# Patient Record
Sex: FEMALE | Race: WHITE | Employment: OTHER | URBAN - METROPOLITAN AREA
[De-identification: names, ages, dates, MRNs, and addresses within clinical notes are randomized per-mention and may not be internally consistent; named-entity substitution may affect disease eponyms.]

---

## 2018-08-13 ENCOUNTER — HOSPITAL ENCOUNTER (OUTPATIENT)
Age: 83
Discharge: HOME | End: 2018-08-15
Payer: MEDICARE

## 2018-08-13 PROCEDURE — 80048 BASIC METABOLIC PNL TOTAL CA: CPT

## 2018-08-13 PROCEDURE — 85025 COMPLETE CBC W/AUTO DIFF WBC: CPT

## 2018-08-13 PROCEDURE — 85651 RBC SED RATE NONAUTOMATED: CPT

## 2018-08-13 PROCEDURE — 36415 COLL VENOUS BLD VENIPUNCTURE: CPT

## 2018-08-13 PROCEDURE — 86141 C-REACTIVE PROTEIN HS: CPT

## 2018-08-14 LAB
ANION GAP SERPL CALCULATED.3IONS-SCNC: 18 MMOL/L (ref 7–16)
BASOPHILS ABSOLUTE: 0.01 E9/L (ref 0–0.2)
BASOPHILS RELATIVE PERCENT: 0.2 % (ref 0–2)
BUN BLDV-MCNC: 17 MG/DL (ref 8–23)
C-REACTIVE PROTEIN, HIGH SENSITIVITY: 5.1 MG/L (ref 0–3)
CALCIUM SERPL-MCNC: 10.3 MG/DL (ref 8.6–10.2)
CHLORIDE BLD-SCNC: 98 MMOL/L (ref 98–107)
CO2: 24 MMOL/L (ref 22–29)
CREAT SERPL-MCNC: 0.8 MG/DL (ref 0.5–1)
EOSINOPHILS ABSOLUTE: 0.09 E9/L (ref 0.05–0.5)
EOSINOPHILS RELATIVE PERCENT: 1.7 % (ref 0–6)
GFR AFRICAN AMERICAN: >60
GFR NON-AFRICAN AMERICAN: >60 ML/MIN/1.73
GLUCOSE BLD-MCNC: 140 MG/DL (ref 74–109)
HCT VFR BLD CALC: 38.3 % (ref 34–48)
HEMOGLOBIN: 12.8 G/DL (ref 11.5–15.5)
IMMATURE GRANULOCYTES #: 0.01 E9/L
IMMATURE GRANULOCYTES %: 0.2 % (ref 0–5)
LYMPHOCYTES ABSOLUTE: 1.76 E9/L (ref 1.5–4)
LYMPHOCYTES RELATIVE PERCENT: 33 % (ref 20–42)
MCH RBC QN AUTO: 32.2 PG (ref 26–35)
MCHC RBC AUTO-ENTMCNC: 33.4 % (ref 32–34.5)
MCV RBC AUTO: 96.2 FL (ref 80–99.9)
MONOCYTES ABSOLUTE: 0.46 E9/L (ref 0.1–0.95)
MONOCYTES RELATIVE PERCENT: 8.6 % (ref 2–12)
NEUTROPHILS ABSOLUTE: 3.01 E9/L (ref 1.8–7.3)
NEUTROPHILS RELATIVE PERCENT: 56.3 % (ref 43–80)
PDW BLD-RTO: 14.1 FL (ref 11.5–15)
PLATELET # BLD: 190 E9/L (ref 130–450)
PMV BLD AUTO: 10.8 FL (ref 7–12)
POTASSIUM SERPL-SCNC: 3.8 MMOL/L (ref 3.5–5)
RBC # BLD: 3.98 E12/L (ref 3.5–5.5)
SEDIMENTATION RATE, ERYTHROCYTE: 30 MM/HR (ref 0–20)
SODIUM BLD-SCNC: 140 MMOL/L (ref 132–146)
WBC # BLD: 5.3 E9/L (ref 4.5–11.5)

## 2021-04-28 ENCOUNTER — HOSPITAL ENCOUNTER (INPATIENT)
Age: 86
LOS: 4 days | Discharge: HOSPICE | DRG: 083 | End: 2021-05-03
Attending: EMERGENCY MEDICINE | Admitting: SURGERY
Payer: MEDICARE

## 2021-04-28 DIAGNOSIS — S02.92XA CLOSED FRACTURE OF FACIAL BONE, UNSPECIFIED FACIAL BONE, INITIAL ENCOUNTER (HCC): ICD-10-CM

## 2021-04-28 DIAGNOSIS — S12.9XXA CLOSED FRACTURE OF CERVICAL VERTEBRA, UNSPECIFIED CERVICAL VERTEBRAL LEVEL, INITIAL ENCOUNTER (HCC): ICD-10-CM
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
## 2021-04-28 DIAGNOSIS — I60.9 SAH (SUBARACHNOID HEMORRHAGE) (HCC): ICD-10-CM

## 2021-04-28 PROCEDURE — 96374 THER/PROPH/DIAG INJ IV PUSH: CPT

## 2021-04-28 PROCEDURE — 99284 EMERGENCY DEPT VISIT MOD MDM: CPT

## 2021-04-28 PROCEDURE — 96375 TX/PRO/DX INJ NEW DRUG ADDON: CPT

## 2021-04-28 RX ORDER — ONDANSETRON 2 MG/ML
INJECTION INTRAMUSCULAR; INTRAVENOUS
Status: COMPLETED
Start: 2021-04-28 | End: 2021-04-29

## 2021-04-28 ASSESSMENT — PAIN DESCRIPTION - PAIN TYPE: TYPE: ACUTE PAIN

## 2021-04-28 NOTE — LETTER
Employer: RETIRED         Nondenominational: Sabianism   Primary Care Provider: Frank Rasmussen DO         Primary Phone: [phone]   EMERGENCY CONTACT   Contact Name Legal Guardian? Relationship to Patient Home Phone Work Phone   1. Sydnee Bowers  2. Cameron Ron      Brother/Sister  Brother/Sister [phone]              GUARANTOR            Guarantor: Eugenia Ochoa     : 1927   Address: [address] [place] [address] Sex: Female     [place] [address]     Relation to Patient: Self       Home Phone: [phone]   Guarantor ID: [id number]       Work Phone:     Guarantor Employer: RETIED         Status: RETIRED      COVERAGE        PRIMARY INSURANCE   Payor: [facility] MEDICARE Plan: CIARA GUIDO*   Payor Address: [facility] [id number] [place] [id number]       Group Number: [id number] Type: INDEMNITY   Subscriber Name: Ashkan Liang : 1927   Subscriber ID: [id number] Pat. Rel. to Sub: Self   SECONDARY INSURANCE   Payor:   Plan:     Payor Address:  ,           Group Number:   Insurance Type:     Subscriber Name:   Subscriber :     Subscriber ID:   Pat.  Rel. to Sub:

## 2021-04-28 NOTE — LETTER
[place] Department Medicaid  CERTIFICATION OF NECESSITY  FOR NON-EMERGENCY TRANSPORTATION   BY GROUND AMBULANCE      Individual Information   1. Name: Enrique Walter 2. [place] Medicaid Billing Number:    3. Address: [facility] Provider Information   4. Provider Name:    5. [place] Provider Number:  National Provider Identifier (NPI):      Certification  7. Criteria:  During transport, this individual requires:  [x] Medical treatment or continuous     supervision by an EMT. [x] The administration or regulation of oxygen by another person. [] Supervised protective restraint. 8. Period Beginning Date: 05/3/2021   9. Length  [x] Not more than 7 day(s)  [] One Year     Additional Information Relevant to Certification   10. Comments or Explanations, If Necessary or Appropriate   2L O2-UNABLE TO REGULATE OWN O2,HIGH FALL RISK, DEP X2,SDH,RETURNIING TO [facility] Practitioner Information   11. Name of Practitioner: Monica Dey MD   12. [place] Medicaid Provider Number, If Applicable:  [address] Provider Identifier (NPI):      Signature Information   14. Date of Signature: 2021 15. Name of Person Signing: Jonas Akers   16. Signature and Professional Designation: Electronically signed by YANNI Brooks on 5/3/2021 at 1:44 PM       [facility] [id number]  Rev. 2015       [address] [place] [address] Encounter Date/Time: 2021 [address] [facility] Account: [de-identified]    MRN: [id number]    Patient: Enrique Walter    Contact Serial #: [id number]      ENCOUNTER          Patient Class: I Private Enc? No Unit RM BD:  [facility] Service: Med/Surg   Encounter DX: SDH (subdural hematoma) *   ADM Provider: Noemí Demarco MD   Procedure:     ATT Provider: Noemí Demarco MD   REF Provider:        Admission DX: SDH (subdural hematoma) ([facility] [address] and codes: 432. 1      PATIENT                 Name: Enrique Walter : 1927 (89 yrs) Address: [place] [address] Sex: Female   City: [address] [place] [address]         Marital Status:    Employer: RETIRED         Restorationism: Worship   Primary Care Provider: DO Vikas Ruiz Phone: [phone]   EMERGENCY CONTACT   Contact Name Legal Guardian? Relationship to Patient Home Phone Work Phone   1. Sydnee Bowers  2. William Moore      Brother/Sister  Brother/Sister [phone]              GUARANTOR            Guarantor: Dave Sharpe     : 1927   Address: [address] [place] [address] [address] Sex: Female     [place] [address]     Relation to Patient: Self       Home Phone: [phone]   Guarantor ID: [id number]       Work Phone:     Guarantor Employer: RETIED         Status: RETIRED      COVERAGE        PRIMARY INSURANCE   Payor: BC[employer] MEDICARE Plan: ANTHEM MEDIBLUE [place]SSENTIA*   Payor Address: [facility] [id number] [place] [id number]       Group Number: [id number] Type: INDEMNITY   Subscriber Name: Kirill Roa : 1927   Subscriber ID: [id number] Pat. Rel. to Sub: Self   SECONDARY INSURANCE   Payor:   Plan:     Payor Address:  ,           Group Number:   Insurance Type:     Subscriber Name:   Subscriber :     Subscriber ID:   Pat.  Rel. to Sub:

## 2021-04-29 ENCOUNTER — APPOINTMENT (OUTPATIENT)
Dept: CT IMAGING | Age: 86
DRG: 083 | End: 2021-04-29
Payer: MEDICARE

## 2021-04-29 ENCOUNTER — APPOINTMENT (OUTPATIENT)
Dept: MRI IMAGING | Age: 86
DRG: 083 | End: 2021-04-29
Payer: MEDICARE

## 2021-04-29 PROBLEM — S06.5XAA SDH (SUBDURAL HEMATOMA): Status: ACTIVE | Noted: 2021-04-29

## 2021-04-29 LAB
ALBUMIN SERPL-MCNC: 3.7 G/DL (ref 3.5–5.2)
ALBUMIN SERPL-MCNC: 3.9 G/DL (ref 3.5–5.2)
ALP BLD-CCNC: 78 U/L (ref 35–104)
ALP BLD-CCNC: 88 U/L (ref 35–104)
ALT SERPL-CCNC: 31 U/L (ref 0–32)
ALT SERPL-CCNC: 34 U/L (ref 0–32)
ANGLE (CLOT STRENGTH): 71.2 DEGREE (ref 59–74)
ANION GAP SERPL CALCULATED.3IONS-SCNC: 11 MMOL/L (ref 7–16)
ANION GAP SERPL CALCULATED.3IONS-SCNC: 11 MMOL/L (ref 7–16)
AST SERPL-CCNC: 29 U/L (ref 0–31)
AST SERPL-CCNC: 37 U/L (ref 0–31)
BASOPHILS ABSOLUTE: 0.01 E9/L (ref 0–0.2)
BASOPHILS RELATIVE PERCENT: 0.1 % (ref 0–2)
BILIRUB SERPL-MCNC: 1.2 MG/DL (ref 0–1.2)
BILIRUB SERPL-MCNC: 1.3 MG/DL (ref 0–1.2)
BUN BLDV-MCNC: 24 MG/DL (ref 6–23)
BUN BLDV-MCNC: 26 MG/DL (ref 6–23)
CALCIUM SERPL-MCNC: 10 MG/DL (ref 8.6–10.2)
CALCIUM SERPL-MCNC: 9.6 MG/DL (ref 8.6–10.2)
CHLORIDE BLD-SCNC: 101 MMOL/L (ref 98–107)
CHLORIDE BLD-SCNC: 99 MMOL/L (ref 98–107)
CO2: 27 MMOL/L (ref 22–29)
CO2: 29 MMOL/L (ref 22–29)
CREAT SERPL-MCNC: 0.8 MG/DL (ref 0.5–1)
CREAT SERPL-MCNC: 0.8 MG/DL (ref 0.5–1)
EKG ATRIAL RATE: 104 BPM
EKG Q-T INTERVAL: 394 MS
EKG QRS DURATION: 88 MS
EKG QTC CALCULATION (BAZETT): 500 MS
EKG R AXIS: -35 DEGREES
EKG T AXIS: 16 DEGREES
EKG VENTRICULAR RATE: 97 BPM
EOSINOPHILS ABSOLUTE: 0.01 E9/L (ref 0.05–0.5)
EOSINOPHILS RELATIVE PERCENT: 0.1 % (ref 0–6)
EPL-TEG: 0 % (ref 0–15)
G-TEG: 10 K D/SC (ref 4.5–11)
GFR AFRICAN AMERICAN: >60
GFR AFRICAN AMERICAN: >60
GFR NON-AFRICAN AMERICAN: >60 ML/MIN/1.73
GFR NON-AFRICAN AMERICAN: >60 ML/MIN/1.73
GLUCOSE BLD-MCNC: 207 MG/DL (ref 74–99)
GLUCOSE BLD-MCNC: 219 MG/DL (ref 74–99)
HCT VFR BLD CALC: 40.7 % (ref 34–48)
HEMOGLOBIN: 13.2 G/DL (ref 11.5–15.5)
IMMATURE GRANULOCYTES #: 0.02 E9/L
IMMATURE GRANULOCYTES %: 0.2 % (ref 0–5)
K (CLOTTING TIME): 1.3 MIN (ref 1–3)
LACTIC ACID: 1.8 MMOL/L (ref 0.5–2.2)
LY30 (FIBRINOLYSIS): 0 % (ref 0–8)
LYMPHOCYTES ABSOLUTE: 0.7 E9/L (ref 1.5–4)
LYMPHOCYTES RELATIVE PERCENT: 8.1 % (ref 20–42)
MA (MAX AMPLITUDE): 66.8 MM (ref 50–70)
MCH RBC QN AUTO: 31.4 PG (ref 26–35)
MCHC RBC AUTO-ENTMCNC: 32.4 % (ref 32–34.5)
MCV RBC AUTO: 96.9 FL (ref 80–99.9)
MONOCYTES ABSOLUTE: 0.35 E9/L (ref 0.1–0.95)
MONOCYTES RELATIVE PERCENT: 4 % (ref 2–12)
NEUTROPHILS ABSOLUTE: 7.58 E9/L (ref 1.8–7.3)
NEUTROPHILS RELATIVE PERCENT: 87.5 % (ref 43–80)
PDW BLD-RTO: 14.3 FL (ref 11.5–15)
PLATELET # BLD: 149 E9/L (ref 130–450)
PMV BLD AUTO: 10.3 FL (ref 7–12)
POTASSIUM REFLEX MAGNESIUM: 3.9 MMOL/L (ref 3.5–5)
POTASSIUM REFLEX MAGNESIUM: 4 MMOL/L (ref 3.5–5)
R (REACTION TIME): 4.8 MIN (ref 5–10)
RBC # BLD: 4.2 E12/L (ref 3.5–5.5)
SODIUM BLD-SCNC: 139 MMOL/L (ref 132–146)
SODIUM BLD-SCNC: 139 MMOL/L (ref 132–146)
TOTAL PROTEIN: 6.5 G/DL (ref 6.4–8.3)
TOTAL PROTEIN: 6.8 G/DL (ref 6.4–8.3)
TROPONIN: <0.01 NG/ML (ref 0–0.03)
WBC # BLD: 8.7 E9/L (ref 4.5–11.5)

## 2021-04-29 PROCEDURE — 93005 ELECTROCARDIOGRAM TRACING: CPT | Performed by: STUDENT IN AN ORGANIZED HEALTH CARE EDUCATION/TRAINING PROGRAM

## 2021-04-29 PROCEDURE — 6360000002 HC RX W HCPCS: Performed by: STUDENT IN AN ORGANIZED HEALTH CARE EDUCATION/TRAINING PROGRAM

## 2021-04-29 PROCEDURE — 70498 CT ANGIOGRAPHY NECK: CPT

## 2021-04-29 PROCEDURE — 85347 COAGULATION TIME ACTIVATED: CPT

## 2021-04-29 PROCEDURE — 2580000003 HC RX 258: Performed by: STUDENT IN AN ORGANIZED HEALTH CARE EDUCATION/TRAINING PROGRAM

## 2021-04-29 PROCEDURE — 84484 ASSAY OF TROPONIN QUANT: CPT

## 2021-04-29 PROCEDURE — 80053 COMPREHEN METABOLIC PANEL: CPT

## 2021-04-29 PROCEDURE — 72141 MRI NECK SPINE W/O DYE: CPT

## 2021-04-29 PROCEDURE — 70450 CT HEAD/BRAIN W/O DYE: CPT

## 2021-04-29 PROCEDURE — 85384 FIBRINOGEN ACTIVITY: CPT

## 2021-04-29 PROCEDURE — 2700000000 HC OXYGEN THERAPY PER DAY

## 2021-04-29 PROCEDURE — 99222 1ST HOSP IP/OBS MODERATE 55: CPT | Performed by: SURGERY

## 2021-04-29 PROCEDURE — 74177 CT ABD & PELVIS W/CONTRAST: CPT

## 2021-04-29 PROCEDURE — 6360000002 HC RX W HCPCS

## 2021-04-29 PROCEDURE — 90471 IMMUNIZATION ADMIN: CPT | Performed by: STUDENT IN AN ORGANIZED HEALTH CARE EDUCATION/TRAINING PROGRAM

## 2021-04-29 PROCEDURE — 71260 CT THORAX DX C+: CPT

## 2021-04-29 PROCEDURE — 85576 BLOOD PLATELET AGGREGATION: CPT

## 2021-04-29 PROCEDURE — 93010 ELECTROCARDIOGRAM REPORT: CPT | Performed by: INTERNAL MEDICINE

## 2021-04-29 PROCEDURE — 99222 1ST HOSP IP/OBS MODERATE 55: CPT | Performed by: OTOLARYNGOLOGY

## 2021-04-29 PROCEDURE — 90715 TDAP VACCINE 7 YRS/> IM: CPT | Performed by: STUDENT IN AN ORGANIZED HEALTH CARE EDUCATION/TRAINING PROGRAM

## 2021-04-29 PROCEDURE — 83605 ASSAY OF LACTIC ACID: CPT

## 2021-04-29 PROCEDURE — 6360000004 HC RX CONTRAST MEDICATION: Performed by: RADIOLOGY

## 2021-04-29 PROCEDURE — 85025 COMPLETE CBC W/AUTO DIFF WBC: CPT

## 2021-04-29 PROCEDURE — 36415 COLL VENOUS BLD VENIPUNCTURE: CPT

## 2021-04-29 PROCEDURE — 2060000000 HC ICU INTERMEDIATE R&B

## 2021-04-29 PROCEDURE — 99223 1ST HOSP IP/OBS HIGH 75: CPT | Performed by: SURGERY

## 2021-04-29 RX ORDER — TRAMADOL HYDROCHLORIDE 50 MG/1
50 TABLET ORAL EVERY 6 HOURS PRN
COMMUNITY

## 2021-04-29 RX ORDER — DIPHENHYDRAMINE HCL 25 MG
25 TABLET ORAL EVERY 6 HOURS PRN
COMMUNITY

## 2021-04-29 RX ORDER — SODIUM CHLORIDE 9 MG/ML
25 INJECTION, SOLUTION INTRAVENOUS PRN
Status: DISCONTINUED | OUTPATIENT
Start: 2021-04-29 | End: 2021-05-03 | Stop reason: HOSPADM

## 2021-04-29 RX ORDER — FERROUS SULFATE 325(65) MG
325 TABLET ORAL
COMMUNITY

## 2021-04-29 RX ORDER — ACETAMINOPHEN 325 MG/1
650 TABLET ORAL EVERY 4 HOURS PRN
COMMUNITY

## 2021-04-29 RX ORDER — FENTANYL CITRATE 50 UG/ML
25 INJECTION, SOLUTION INTRAMUSCULAR; INTRAVENOUS ONCE
Status: COMPLETED | OUTPATIENT
Start: 2021-04-29 | End: 2021-04-29

## 2021-04-29 RX ORDER — A/C/E/ZINC/SOD SELENATE/COPPER 5000-60-30
1 TABLET ORAL DAILY
COMMUNITY

## 2021-04-29 RX ORDER — SODIUM CHLORIDE 9 MG/ML
INJECTION, SOLUTION INTRAVENOUS CONTINUOUS
Status: DISCONTINUED | OUTPATIENT
Start: 2021-04-29 | End: 2021-04-30

## 2021-04-29 RX ORDER — ACETAMINOPHEN 325 MG/1
650 TABLET ORAL EVERY 4 HOURS PRN
Status: DISCONTINUED | OUTPATIENT
Start: 2021-04-29 | End: 2021-04-30

## 2021-04-29 RX ORDER — SENNA PLUS 8.6 MG/1
1 TABLET ORAL DAILY
COMMUNITY

## 2021-04-29 RX ORDER — TETANUS AND DIPHTHERIA TOXOIDS ADSORBED 2; 2 [LF]/.5ML; [LF]/.5ML
0.5 INJECTION INTRAMUSCULAR ONCE
Status: DISCONTINUED | OUTPATIENT
Start: 2021-04-29 | End: 2021-05-03 | Stop reason: HOSPADM

## 2021-04-29 RX ORDER — LEVETIRACETAM 5 MG/ML
500 INJECTION INTRAVASCULAR EVERY 12 HOURS
Status: DISCONTINUED | OUTPATIENT
Start: 2021-04-29 | End: 2021-05-03 | Stop reason: HOSPADM

## 2021-04-29 RX ORDER — SODIUM CHLORIDE 0.9 % (FLUSH) 0.9 %
5-40 SYRINGE (ML) INJECTION EVERY 12 HOURS SCHEDULED
Status: DISCONTINUED | OUTPATIENT
Start: 2021-04-29 | End: 2021-05-03 | Stop reason: HOSPADM

## 2021-04-29 RX ORDER — POLYETHYLENE GLYCOL 3350 17 G/17G
17 POWDER, FOR SOLUTION ORAL DAILY PRN
Status: DISCONTINUED | OUTPATIENT
Start: 2021-04-29 | End: 2021-05-03 | Stop reason: HOSPADM

## 2021-04-29 RX ORDER — SODIUM CHLORIDE 9 MG/ML
INJECTION, SOLUTION INTRAVENOUS CONTINUOUS
Status: DISCONTINUED | OUTPATIENT
Start: 2021-04-29 | End: 2021-04-29 | Stop reason: SDUPTHER

## 2021-04-29 RX ORDER — ASPIRIN 81 MG/1
81 TABLET ORAL DAILY
COMMUNITY

## 2021-04-29 RX ORDER — SODIUM CHLORIDE 0.9 % (FLUSH) 0.9 %
5-40 SYRINGE (ML) INJECTION PRN
Status: DISCONTINUED | OUTPATIENT
Start: 2021-04-29 | End: 2021-05-03 | Stop reason: HOSPADM

## 2021-04-29 RX ORDER — METOPROLOL SUCCINATE 100 MG/1
100 TABLET, EXTENDED RELEASE ORAL DAILY
COMMUNITY

## 2021-04-29 RX ORDER — FENTANYL CITRATE 50 UG/ML
25 INJECTION, SOLUTION INTRAMUSCULAR; INTRAVENOUS EVERY 4 HOURS PRN
Status: DISCONTINUED | OUTPATIENT
Start: 2021-04-29 | End: 2021-04-30

## 2021-04-29 RX ORDER — ASPIRIN 81 MG/1
81 TABLET, CHEWABLE ORAL DAILY
Status: DISCONTINUED | OUTPATIENT
Start: 2021-04-29 | End: 2021-04-29

## 2021-04-29 RX ORDER — LEVETIRACETAM 500 MG/1
500 TABLET ORAL 2 TIMES DAILY
Status: DISCONTINUED | OUTPATIENT
Start: 2021-04-29 | End: 2021-04-29

## 2021-04-29 RX ORDER — ONDANSETRON 2 MG/ML
4 INJECTION INTRAMUSCULAR; INTRAVENOUS EVERY 6 HOURS PRN
Status: DISCONTINUED | OUTPATIENT
Start: 2021-04-29 | End: 2021-05-03 | Stop reason: HOSPADM

## 2021-04-29 RX ADMIN — FENTANYL CITRATE 25 MCG: 50 INJECTION, SOLUTION INTRAMUSCULAR; INTRAVENOUS at 19:08

## 2021-04-29 RX ADMIN — LEVETIRACETAM 500 MG: 5 INJECTION INTRAVENOUS at 14:30

## 2021-04-29 RX ADMIN — FENTANYL CITRATE 25 MCG: 50 INJECTION, SOLUTION INTRAMUSCULAR; INTRAVENOUS at 14:32

## 2021-04-29 RX ADMIN — ONDANSETRON 4 MG: 2 INJECTION INTRAMUSCULAR; INTRAVENOUS at 00:02

## 2021-04-29 RX ADMIN — FENTANYL CITRATE 25 MCG: 50 INJECTION, SOLUTION INTRAMUSCULAR; INTRAVENOUS at 01:48

## 2021-04-29 RX ADMIN — SODIUM CHLORIDE: 9 INJECTION, SOLUTION INTRAVENOUS at 08:20

## 2021-04-29 RX ADMIN — Medication 10 ML: at 14:24

## 2021-04-29 RX ADMIN — SODIUM CHLORIDE: 9 INJECTION, SOLUTION INTRAVENOUS at 14:24

## 2021-04-29 RX ADMIN — LEVETIRACETAM 500 MG: 5 INJECTION INTRAVENOUS at 01:48

## 2021-04-29 RX ADMIN — TETANUS TOXOID, REDUCED DIPHTHERIA TOXOID AND ACELLULAR PERTUSSIS VACCINE, ADSORBED 0.5 ML: 5; 2.5; 8; 8; 2.5 SUSPENSION INTRAMUSCULAR at 01:49

## 2021-04-29 RX ADMIN — IOPAMIDOL 90 ML: 755 INJECTION, SOLUTION INTRAVENOUS at 01:27

## 2021-04-29 RX ADMIN — SODIUM CHLORIDE: 9 INJECTION, SOLUTION INTRAVENOUS at 01:48

## 2021-04-29 RX ADMIN — FENTANYL CITRATE 25 MCG: 50 INJECTION, SOLUTION INTRAMUSCULAR; INTRAVENOUS at 10:30

## 2021-04-29 ASSESSMENT — PAIN SCALES - GENERAL
PAINLEVEL_OUTOF10: 8
PAINLEVEL_OUTOF10: 7
PAINLEVEL_OUTOF10: 6

## 2021-04-29 NOTE — DISCHARGE SUMMARY
Physician Discharge Summary     Patient ID:  Flaquita Martinez  [id number]  85 y.o.  9/11/1927    Admit date: 4/28/2021    Discharge date and time: 05/03/21     Admitting Physician: Birgit Russ MD     Admission Diagnoses: SDH (subdural hematoma) [facility]) [S06.5X9A]    Discharge Diagnoses: Active Problems:    SDH (subdural hematoma) (HCC)  Resolved Problems:    * No resolved hospital problems. *      Admission Condition: serious    Discharged Condition: terminal    Indication for Admission: SDH, SAH, cervical fractures    Hospital Course/Procedures/Operation/treatments:   4/28: Presenting from [facility] for SDH and SAH, and C1 C2 fracture after fall at nursing facility. CTA neck revealing blunt right vertebral artery injury. Repeat head CT stable. Nasal, septal, sphenoid, maxillary fractures with pneumocephalus of the sella tursica. GCS 14   4/29: MRI cervical pending. On keppra. GCS 13 this morning, difficult to ascertain as eyes are swollen shut. Was following simple commands yesterday but only localizing to pain today. 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 Awaiting neurosurgery recommendations. Patient more alert and following simple commands this morning. Patient does not need to be on aspirin as she only had a torturous carotid not injury. Patient needs to work with PT/OT, dispel planning  5/1: Still awaiting final neurosurgery recommendations. Palliative is seen the patient and discussed with family, they are considering hospice.   Patient was agitated overnight received doses of Haldol and Ativan per palliative  5/2: Family decided DNR - CC awaiting final placement  5/3: no acute changes overnight, discharge planning for hospice            Consults:   IP CONSULT TO OTOLARYNGOLOGY  IP CONSULT TO NEUROSURGERY  IP CONSULT TO VASCULAR SURGERY  IP CONSULT TO PALLIATIVE CARE  INPATIENT CONSULT TO ORTHOTIST/PROSTHETIST  IP CONSULT TO HOSPICE    Significant Diagnostic Studies:   Ct Head Wo Contrast    Result Date: 4/29/2021  EXAMINATION: CT OF THE HEAD WITHOUT CONTRAST  4/29/2021 1:25 am TECHNIQUE: CT of the head was performed without the administration of intravenous contrast. Dose modulation, iterative reconstruction, and/or weight based adjustment of the mA/kV was utilized to reduce the radiation dose to as low as reasonably achievable. COMPARISON: 04/28/2021 HISTORY: ORDERING SYSTEM PROVIDED HISTORY: Evaluate SAH and SDH, compare to previous outside imaging FINDINGS: BRAIN/VENTRICLES: No significant interval change in the previously visualized subdural hematoma along the right frontal convexity and subarachnoid hemorrhage in bilateral inferior frontal lobes. No significant mass effect or midline shift. There is prominence of the ventricles and sulci due to global parenchymal volume loss. There are nonspecific areas of hypoattenuation within the periventricular and subcortical white matter, which likely represent chronic microvascular ischemic change. ORBITS: The visualized portion of the orbits demonstrate no acute abnormality. SINUSES: Air-fluid levels in the paranasal sinuses. Mastoid air cells are clear. SOFT TISSUES/SKULL: Bilateral nasal bone fractures are present. Anterior scalp hematoma     No significant change in the previously visualized subdural hematoma along the right frontal convexity and subarachnoid hemorrhage in bilateral inferior frontal lobes. No significant mass effect or midline shift. Air-fluid levels in the paranasal sinuses. Anterior scalp hematoma. Ct Chest W Contrast    Result Date: 4/29/2021  EXAMINATION: CT OF THE CHEST WITH CONTRAST; CT OF THE ABDOMEN AND PELVIS WITH CONTRAST 4/29/2021 12:25 am TECHNIQUE: CT of the chest was performed with the administration of intravenous contrast. Multiplanar reformatted images are provided for review.  Dose modulation, iterative reconstruction, and/or weight based adjustment of the mA/kV was utilized to reduce the radiation dose to as low as reasonably achievable.; CT of the abdomen and pelvis was performed with the administration of intravenous contrast. Multiplanar reformatted images are provided for review. Dose modulation, iterative reconstruction, and/or weight based adjustment of the mA/kV was utilized to reduce the radiation dose to as low as reasonably achievable. COMPARISON: None HISTORY: ORDERING SYSTEM PROVIDED HISTORY: trauma TECHNOLOGIST PROVIDED HISTORY: Reason for exam:->trauma Decision Support Exception->Emergency Medical Condition ([place]) What reading provider will be dictating this exam?->CRC FINDINGS: Chest: Mediastinum: No evidence of mediastinal hematoma. No aortic dissection or aneurysmal dilatation. No hilar or mediastinal adenopathy. No pericardial effusion. Cardiomegaly with coronary atherosclerotic calcifications. Lungs/pleura: No evidence of pneumothorax, hemothorax, or lung contusion. Bilateral basilar atelectatic changes. Soft Tissues/Bones: No acute rib fracture. Prominent degenerative changes of the right glenohumeral joint. Prominent degenerative changes of the thoracic spine. Abdomen/Pelvis: Organs: The liver, spleen, pancreas are within normal limits. Status post cholecystectomy. Tiny bilateral renal cysts. Mild nodular enlargement of bilateral adrenal glands, most likely adenoma. No evidence of intra-abdominal visceral injury. GI/Bowel: No bowel obstruction or free intraperitoneal air. No mesenteric hematoma. Pelvis: Urinary bladder within normal limits. Status post hysterectomy. No free fluid in the pelvis or hemoperitoneum. Peritoneum/Retroperitoneum: No retroperitoneal hematoma. Bones/Soft Tissues: No acute bony pathology. Status post internal fixation of the left hip with chronic remodeling. No evidence of acute pelvic or hip fracture. Prominent degenerative changes of the lumbar spine. Compression deformity of L5, likely chronic. No evidence of acute thoracic, abdominal or pelvic pathology. Cardiomegaly with coronary atherosclerotic calcifications. Compression deformity of L5, likely chronic. Other chronic or incidental findings as noted. Ct Abdomen Pelvis W Iv Contrast Additional Contrast? None    Result Date: 4/29/2021  EXAMINATION: CT OF THE CHEST WITH CONTRAST; CT OF THE ABDOMEN AND PELVIS WITH CONTRAST 4/29/2021 12:25 am TECHNIQUE: CT of the chest was performed with the administration of intravenous contrast. Multiplanar reformatted images are provided for review. Dose modulation, iterative reconstruction, and/or weight based adjustment of the mA/kV was utilized to reduce the radiation dose to as low as reasonably achievable.; CT of the abdomen and pelvis was performed with the administration of intravenous contrast. Multiplanar reformatted images are provided for review. Dose modulation, iterative reconstruction, and/or weight based adjustment of the mA/kV was utilized to reduce the radiation dose to as low as reasonably achievable. COMPARISON: None HISTORY: ORDERING SYSTEM PROVIDED HISTORY: trauma TECHNOLOGIST PROVIDED HISTORY: Reason for exam:->trauma Decision Support Exception->Emergency Medical Condition ([place]) What reading provider will be dictating this exam?->CRC FINDINGS: Chest: Mediastinum: No evidence of mediastinal hematoma. No aortic dissection or aneurysmal dilatation. No hilar or mediastinal adenopathy. No pericardial effusion. Cardiomegaly with coronary atherosclerotic calcifications. Lungs/pleura: No evidence of pneumothorax, hemothorax, or lung contusion. Bilateral basilar atelectatic changes. Soft Tissues/Bones: No acute rib fracture. Prominent degenerative changes of the right glenohumeral joint. Prominent degenerative changes of the thoracic spine. Abdomen/Pelvis: Organs: The liver, spleen, pancreas are within normal limits. Status post cholecystectomy. Tiny bilateral renal cysts.  Mild nodular enlargement of bilateral adrenal glands, most likely adenoma. No evidence of intra-abdominal visceral injury. GI/Bowel: No bowel obstruction or free intraperitoneal air. No mesenteric hematoma. Pelvis: Urinary bladder within normal limits. Status post hysterectomy. No free fluid in the pelvis or hemoperitoneum. Peritoneum/Retroperitoneum: No retroperitoneal hematoma. Bones/Soft Tissues: No acute bony pathology. Status post internal fixation of the left hip with chronic remodeling. No evidence of acute pelvic or hip fracture. Prominent degenerative changes of the lumbar spine. Compression deformity of L5, likely chronic. No evidence of acute thoracic, abdominal or pelvic pathology. Cardiomegaly with coronary atherosclerotic calcifications. Compression deformity of L5, likely chronic. Other chronic or incidental findings as noted. Cta Neck W Contrast    Result Date: 4/29/2021  EXAMINATION: CTA OF THE NECK 4/29/2021 1:25 am TECHNIQUE: CTA of the neck was performed with the administration of intravenous contrast. Multiplanar reformatted images are provided for review. MIP images are provided for review. Stenosis of the internal carotid arteries measured using NASCET criteria. Dose modulation, iterative reconstruction, and/or weight based adjustment of the mA/kV was utilized to reduce the radiation dose to as low as reasonably achievable. COMPARISON: None. HISTORY: ORDERING SYSTEM PROVIDED HISTORY: trauma FINDINGS: AORTIC ARCH/ARCH VESSELS: No dissection or arterial injury. No significant stenosis of the brachiocephalic or subclavian arteries. CAROTID ARTERIES: Mild atherosclerosis in carotid bifurcations. No dissection, arterial injury, or hemodynamically significant stenosis by NASCET criteria. VERTEBRAL ARTERIES: Mild luminal irregularity of right vertebral artery at C1-C2 level. Left vertebral artery is unremarkable. SOFT TISSUES: The lung apices are clear. No cervical or superior mediastinal lymphadenopathy. The larynx and pharynx are unremarkable. No acute abnormality of the salivary and thyroid glands. BONES: Nondisplaced fractures through the odontoid process and posterior arch of C1. Nondisplaced fractures through the odontoid process and posterior arch of C1. Mild luminal irregularity of right vertebral artery at C1-C2 level is suggestive low-grade arterial injury. Discharge Exam:  No apparent distress  Pupils equal round reactive light  Heart systolic murmur present  Abdomen soft nontender nondistended    Disposition: hospice    In process/preliminary results:  Outstanding Order Results     No orders found from 3/30/2021 to 4/29/2021.           Patient Instructions:   Current Discharge Medication List           Details   acetaminophen (TYLENOL) 325 MG tablet Take 650 mg by mouth every 4 hours as needed for Pain or Fever      aspirin 81 MG EC tablet Take 81 mg by mouth daily      diphenhydrAMINE (BENADRYL) 25 MG tablet Take 25 mg by mouth every 6 hours as needed for Itching      ferrous sulfate (IRON 325) 325 (65 Fe) MG tablet Take 325 mg by mouth daily (with breakfast)      mirabegron (MYRBETRIQ) 25 MG TB24 Take 25 mg by mouth daily      Multiple Vitamins-Minerals (PROSIGHT) TABS Take 1 tablet by mouth daily      senna (SENOKOT) 8.6 MG tablet Take 1 tablet by mouth daily      metoprolol succinate (TOPROL XL) 100 MG extended release tablet Take 100 mg by mouth daily      traMADol (ULTRAM) 50 MG tablet Take 50 mg by mouth every 6 hours as needed for Pain.      vitamin D (CHOLECALCIFEROL) 25 MCG (1000 UT) TABS tablet Take 1,000 Units by mouth daily      citalopram (CELEXA) 20 MG tablet Take 20 mg by mouth daily       hydrochlorothiazide (HYDRODIURIL) 25 MG tablet Take 25 mg by mouth daily      liothyronine (CYTOMEL) 5 MCG tablet Take 5 mcg by mouth daily              SPECIAL CONSIDERATIONS FOR OUR PATIENTS OVER THE AGE OF 65Y    Getting around your home safely can be a challenge if you have injuries or health problems that make it easy for you to fall. Loose rugs and furniture in walkways are among the dangers for many older people who have problems walking or who have poor eyesight. People who have conditions such as arthritis, osteoporosis, or dementia also must be careful not to fall. You can make your home safer with a few simple measures. Follow-up care is a key part of your treatment and safety. Be sure to make and go to all appointments, and call your doctor or nurse call line if you are having problems. It's also a good idea to know your test results and keep a list of the medicines you take. How can you care for yourself at home? Taking care of yourself   You may get dizzy if you do not drink enough water. To prevent dehydration, drink plenty of fluids, enough so that your urine is light yellow or clear like water. Choose water and other caffeine-free clear liquids. If you have kidney, heart, or liver disease and have to limit fluids, talk with your doctor before you increase the amount of fluids you drink.  Exercise regularly to improve your strength, muscle tone, and balance. Walk if you can. Swimming may be a good choice if you cannot walk easily.  Have your vision and hearing checked each year or any time you notice a change. If you have trouble seeing and hearing, you might not be able to avoid objects and could lose your balance.  Know the side effects of the medicines you take. Ask your doctor or pharmacist whether the medicines you take can affect your balance. Sleeping pills or sedatives can affect your balance.  Limit the amount of alcohol you drink. Alcohol can impair your balance and other senses.  Ask your doctor whether calluses or corns on your feet need to be removed. If you wear loose-fitting shoes because of calluses or corns, you can lose your balance and fall.  Talk to your doctor if you have numbness in your feet.     Preventing falls at home   Remove raised shower with your strong side first.   Repair loose toilet seats and consider installing a raised toilet seat to make getting on and off the toilet easier.  Keep your washroom door unlocked while you are in the shower.         Follow up:   Frank Rasmussen DO  [address] [facility]  [phone]      As needed       Signed:  LUIS Goodson NP  5/3/2021  1:49 PM

## 2021-04-29 NOTE — CONSULTS
Otolaryngology  Consult Note    Patient's Name/Date of Birth: Ankit Larios / 9/11/1927 (75 y.o.)    Date: April 29, 2021     Chief Complaint: Facial trauma    HPI: 81 y/o F who had a fall and hit her head. She is resting comfortably and in no acute distress. No epistaxis and no nasal obstruction. She has associated C1 and C2 fracture and subdural hematoma. Past Medical History:   Diagnosis Date    Bladder atony     Hypertension        History reviewed. No pertinent surgical history. Prior to Admission medications    Medication Sig Start Date End Date Taking? Authorizing Provider   oxybutynin (DITROPAN) 5 MG tablet Take 5 mg by mouth 3 times daily    Historical Provider, MD   metoprolol tartrate (LOPRESSOR) 25 MG tablet Take 25 mg by mouth 2 times daily    Historical Provider, MD   lisinopril (PRINIVIL;ZESTRIL) 10 MG tablet Take 10 mg by mouth daily    Historical Provider, MD   citalopram (CELEXA) 10 MG tablet Take 10 mg by mouth daily    Historical Provider, MD   hydrochlorothiazide (HYDRODIURIL) 25 MG tablet Take 25 mg by mouth daily    Historical Provider, MD   liothyronine (CYTOMEL) 25 MCG tablet Take 25 mcg by mouth daily    Historical Provider, MD       Allergies   Allergen Reactions    Amoxicillin     Morphine        History reviewed. No pertinent family history. Social History     Socioeconomic History    Marital status:       Spouse name: Not on file    Number of children: Not on file    Years of education: Not on file    Highest education level: Not on file   Occupational History    Not on file   Social Needs    Financial resource strain: Not on file    Food insecurity     Worry: Not on file     Inability: Not on file    Transportation needs     Medical: Not on file     Non-medical: Not on file   Tobacco Use    Smoking status: Unknown If Ever Smoked   Substance and Sexual Activity    Alcohol use: Not on file    Drug use: Not on file    Sexual activity: Not on file   Lifestyle ALKPHOS 88   PROT 6.8   LABALBU 3.9     No results for input(s): LACTATE in the last 72 hours. No results for input(s): INR, PTT in the last 72 hours. Invalid input(s): PT    RADIOLOGY    Ct Head Wo Contrast    Result Date: 4/29/2021  EXAMINATION: CT OF THE HEAD WITHOUT CONTRAST  4/29/2021 1:25 am TECHNIQUE: CT of the head was performed without the administration of intravenous contrast. Dose modulation, iterative reconstruction, and/or weight based adjustment of the mA/kV was utilized to reduce the radiation dose to as low as reasonably achievable. COMPARISON: 04/28/2021 HISTORY: ORDERING SYSTEM PROVIDED HISTORY: Evaluate SAH and SDH, compare to previous outside imaging FINDINGS: BRAIN/VENTRICLES: No significant interval change in the previously visualized subdural hematoma along the right frontal convexity and subarachnoid hemorrhage in bilateral inferior frontal lobes. No significant mass effect or midline shift. There is prominence of the ventricles and sulci due to global parenchymal volume loss. There are nonspecific areas of hypoattenuation within the periventricular and subcortical white matter, which likely represent chronic microvascular ischemic change. ORBITS: The visualized portion of the orbits demonstrate no acute abnormality. SINUSES: Air-fluid levels in the paranasal sinuses. Mastoid air cells are clear. SOFT TISSUES/SKULL: Bilateral nasal bone fractures are present. Anterior scalp hematoma     No significant change in the previously visualized subdural hematoma along the right frontal convexity and subarachnoid hemorrhage in bilateral inferior frontal lobes. No significant mass effect or midline shift. Air-fluid levels in the paranasal sinuses. Anterior scalp hematoma.      Ct Chest W Contrast    Result Date: 4/29/2021  EXAMINATION: CT OF THE CHEST WITH CONTRAST; CT OF THE ABDOMEN AND PELVIS WITH CONTRAST 4/29/2021 12:25 am TECHNIQUE: CT of the chest was performed with the administration evidence of acute pelvic or hip fracture. Prominent degenerative changes of the lumbar spine. Compression deformity of L5, likely chronic. No evidence of acute thoracic, abdominal or pelvic pathology. Cardiomegaly with coronary atherosclerotic calcifications. Compression deformity of L5, likely chronic. Other chronic or incidental findings as noted. Ct Abdomen Pelvis W Iv Contrast Additional Contrast? None    Result Date: 4/29/2021  EXAMINATION: CT OF THE CHEST WITH CONTRAST; CT OF THE ABDOMEN AND PELVIS WITH CONTRAST 4/29/2021 12:25 am TECHNIQUE: CT of the chest was performed with the administration of intravenous contrast. Multiplanar reformatted images are provided for review. Dose modulation, iterative reconstruction, and/or weight based adjustment of the mA/kV was utilized to reduce the radiation dose to as low as reasonably achievable.; CT of the abdomen and pelvis was performed with the administration of intravenous contrast. Multiplanar reformatted images are provided for review. Dose modulation, iterative reconstruction, and/or weight based adjustment of the mA/kV was utilized to reduce the radiation dose to as low as reasonably achievable. COMPARISON: None HISTORY: ORDERING SYSTEM PROVIDED HISTORY: trauma TECHNOLOGIST PROVIDED HISTORY: Reason for exam:->trauma Decision Support Exception->Emergency Medical Condition (MA) What reading provider will be dictating this exam?->CRC FINDINGS: Chest: Mediastinum: No evidence of mediastinal hematoma. No aortic dissection or aneurysmal dilatation. No hilar or mediastinal adenopathy. No pericardial effusion. Cardiomegaly with coronary atherosclerotic calcifications. Lungs/pleura: No evidence of pneumothorax, hemothorax, or lung contusion. Bilateral basilar atelectatic changes. Soft Tissues/Bones: No acute rib fracture. Prominent degenerative changes of the right glenohumeral joint. Prominent degenerative changes of the thoracic spine.  Abdomen/Pelvis: Organs: The liver, spleen, pancreas are within normal limits. Status post cholecystectomy. Tiny bilateral renal cysts. Mild nodular enlargement of bilateral adrenal glands, most likely adenoma. No evidence of intra-abdominal visceral injury. GI/Bowel: No bowel obstruction or free intraperitoneal air. No mesenteric hematoma. Pelvis: Urinary bladder within normal limits. Status post hysterectomy. No free fluid in the pelvis or hemoperitoneum. Peritoneum/Retroperitoneum: No retroperitoneal hematoma. Bones/Soft Tissues: No acute bony pathology. Status post internal fixation of the left hip with chronic remodeling. No evidence of acute pelvic or hip fracture. Prominent degenerative changes of the lumbar spine. Compression deformity of L5, likely chronic. No evidence of acute thoracic, abdominal or pelvic pathology. Cardiomegaly with coronary atherosclerotic calcifications. Compression deformity of L5, likely chronic. Other chronic or incidental findings as noted. Cta Neck W Contrast    Result Date: 4/29/2021  EXAMINATION: CTA OF THE NECK 4/29/2021 1:25 am TECHNIQUE: CTA of the neck was performed with the administration of intravenous contrast. Multiplanar reformatted images are provided for review. MIP images are provided for review. Stenosis of the internal carotid arteries measured using NASCET criteria. Dose modulation, iterative reconstruction, and/or weight based adjustment of the mA/kV was utilized to reduce the radiation dose to as low as reasonably achievable. COMPARISON: None. HISTORY: ORDERING SYSTEM PROVIDED HISTORY: trauma FINDINGS: AORTIC ARCH/ARCH VESSELS: No dissection or arterial injury. No significant stenosis of the brachiocephalic or subclavian arteries. CAROTID ARTERIES: Mild atherosclerosis in carotid bifurcations. No dissection, arterial injury, or hemodynamically significant stenosis by NASCET criteria.  VERTEBRAL ARTERIES: Mild luminal irregularity of right vertebral artery at C1-C2 level. Left vertebral artery is unremarkable. SOFT TISSUES: The lung apices are clear. No cervical or superior mediastinal lymphadenopathy. The larynx and pharynx are unremarkable. No acute abnormality of the salivary and thyroid glands. BONES: Nondisplaced fractures through the odontoid process and posterior arch of C1. Nondisplaced fractures through the odontoid process and posterior arch of C1. Mild luminal irregularity of right vertebral artery at C1-C2 level is suggestive low-grade arterial injury. Assessment/Plan:  1. 79 y/o F with multiple facial fractures    - no active epistaxis and nasal packing is out  - CT facial bones in PACS reviewed, minimally displaced fractures  - likely no surgical intervention at this time  - nasal saline irrigations  - monitor for signs of CSF leak  - she can follow up in 2-3 weeks after she recovers and to reevaluate nasal obstruction    Will discuss with attending    Electronically signed by Austen Pratt DO on 4/29/21 at 6:43 AM DEMETRICET            Madie Norwood  9/11/1927      I have discussed the case, including pertinent history and exam findings with the resident. I have seen and examined the patient and the key elements of the encounter have been performed by me. I agree with the assessment, plan and orders as documented by the resident. Patient here for follow up of medical problems. Remainder of medical problems as per resident note.       [address] [facility], DO  4/29/21

## 2021-04-29 NOTE — ED NOTES
Daughter's phone number, America Baum [phone].   Please call daughter with update     David Trent RN  04/29/21 0020

## 2021-04-29 NOTE — ED PROVIDER NOTES
HPI:  4/29/21, Time: 12:44 AM EDT         Dave Sharpe is a 80 y.o. female presenting to the ED for trauma. Patient had mechanical fall at her nursing home. She did strike her face, there is loss of conscious, she is on no anticoagulation. She was evaluated in outside facility and found to have multiple facial fractures, cervical fracture, subarachnoid hemorrhage. Her pain is controlled at this time. She denies any nausea, vomiting, fever, chills, cough, sputum, change in bowel or bladder, neck pain or stiffness, lethargy, or any other symptoms or complaints. Review of Systems:   A complete review of systems was performed and pertinent positives and negatives are stated within HPI, all other systems reviewed and are negative.          --------------------------------------------- PAST HISTORY ---------------------------------------------  Past Medical History:  has a past medical history of Bladder atony and Hypertension. Past Surgical History:  has no past surgical history on file. Social History:      Family History: family history is not on file. The patients home medications have been reviewed. Allergies: Amoxicillin and Morphine    -------------------------------------------------- RESULTS -------------------------------------------------  All laboratory and radiology results have been personally reviewed by myself   LABS:  No results found for this visit on 04/28/21. RADIOLOGY:  Interpreted by Radiologist.  [address]    (Results Pending)   CT ABDOMEN PELVIS W IV CONTRAST Additional Contrast? None    (Results Pending)       ------------------------- NURSING NOTES AND VITALS REVIEWED ---------------------------   The nursing notes within the ED encounter and vital signs as below have been reviewed.    BP (!) 165/111   Pulse 78   Temp 97.7 °F (36.5 °C) (Temporal)   Resp 16   Ht 5' (1.524 m)   Wt 135 lb (61.2 kg)   SpO2 98%   BMI 26.37 kg/m²   Oxygen Saturation Interpretation: Normal      ---------------------------------------------------PHYSICAL EXAM--------------------------------------      Constitutional/General: Alert and oriented x3, well appearing, non toxic in NAD  Head: Normocephalic with ecchymosis over the forehead  Eyes: PERRL, EOMI, bilateral ecchymosis around the orbits   Mouth: Oropharynx clear, handling secretions, no trismus  Neck: C-collar intact  Pulmonary: Lungs clear to auscultation bilaterally, no wheezes, rales, or rhonchi. Not in respiratory distress  Cardiovascular:  Regular rate and rhythm, no murmurs, gallops, or rubs. 2+ distal pulses  Abdomen: Soft, non tender, non distended,   Extremities: Moves all extremities x 4. Warm and well perfused  Skin: warm and dry without rash  Neurologic: GCS 15,  Psych: Normal Affect      ------------------------------ ED COURSE/MEDICAL DECISION MAKING----------------------  Medications   levETIRAcetam (KEPPRA) tablet 500 mg (has no administration in time range)   ondansetron (ZOFRAN) injection 4 mg (4 mg Intravenous Given 4/29/21 0002)         ED COURSE:       Medical Decision Making:    Trauma to evaluate    Counseling: The emergency provider has spoken with the patient and discussed todays results, in addition to providing specific details for the plan of care and counseling regarding the diagnosis and prognosis. Questions are answered at this time and they are agreeable with the plan.      --------------------------------- IMPRESSION AND DISPOSITION ---------------------------------    IMPRESSION  1. Fall, initial encounter    2. SAH (subarachnoid hemorrhage) ([facility])    3. Closed fracture of cervical vertebra, unspecified cervical vertebral level, initial encounter ([facility] [address]    4. Closed fracture of facial bone, unspecified facial bone, initial encounter ([facility] [address]        DISPOSITION  Disposition: Admit to Trauma  Patient condition is stable      NOTE: This report was transcribed using voice recognition software. Every effort was made to ensure accuracy; however, inadvertent computerized transcription errors may be present        Kristie Adkins MD  04/29/21 0105

## 2021-04-29 NOTE — ED NOTES
Aspen collar remains on and aligned, vss, family at bed side, no distress noted, monitor on and functional, will continue to monitor     Mahin Wilcox RN  04/29/21 1200

## 2021-04-29 NOTE — PROGRESS NOTES
TRAUMA TERTIARY    Admit Date: 4/28/2021    [facility]    CC:    Chief Complaint   Patient presents with   Italo Montejo Fall     transfer from [facility]. fall from [facility]. has nasal fx, traumatic subarachnoid and a small rt frontal subdurl hematoma. also cervical 1 fx        Alcohol pre-screening:  How many times in the past year have you had 4-5 or more drinks in a day?  none    Subjective:       Patient unable to open her eyes actively due to swelling. She was able to say \"good morning\" but would not answer anything else. Her nose has stopped bleeding. She is still in a collar. Still grimacing to palpation of the abdomen      Objective:     Patient Vitals for the past 8 hrs:   BP Temp Temp src Pulse Resp SpO2 Height Weight   04/29/21 0548 (!) 120/108 -- -- 96 23 -- -- --   04/29/21 0120 (!) 159/76 -- -- 74 18 94 % -- --   04/28/21 2320 (!) 165/111 97.7 °F (36.5 °C) Temporal 78 16 98 % 5' (1.524 m) 135 lb (61.2 kg)       No intake/output data recorded. No intake/output data recorded. Radiology:  CT HEAD WO CONTRAST   Final Result   No significant change in the previously visualized subdural hematoma along   the right frontal convexity and subarachnoid hemorrhage in bilateral inferior   frontal lobes. No significant mass effect or midline shift. Air-fluid levels in the paranasal sinuses. Anterior scalp hematoma. CT ABDOMEN PELVIS W IV CONTRAST Additional Contrast? None   Final Result   No evidence of acute thoracic, abdominal or pelvic pathology. Cardiomegaly with coronary atherosclerotic calcifications. Compression deformity of L5, likely chronic. Other chronic or incidental findings as noted. CTA NECK W CONTRAST   Final Result   Nondisplaced fractures through the odontoid process and posterior arch of C1. Mild luminal irregularity of right vertebral artery at C1-C2 level is   suggestive low-grade arterial injury.          CT CHEST W CONTRAST   Final Result   No evidence of acute thoracic, abdominal or pelvic pathology. Cardiomegaly with coronary atherosclerotic calcifications. Compression deformity of L5, likely chronic. Other chronic or incidental findings as noted. MRI CERVICAL SPINE WO CONTRAST    (Results Pending)       PHYSICAL EXAM:   GCS:  3 - Opens to stimulus  5 - localizes to pain  4 - Confused    Pupil size: Left 3 mm     Right 3 mm  Pupil reaction: Yes  Wiggles fingers: Left Yes     Right Yes  Wiggles toes: Left Yes     Right Yes  Plantar flexion: Left normal     Right normal    GENERAL: Intermittently responsive, appears in pain  HEAD:  Periorbital ecchymosis, depressed nasal bridge. Dried blood in the oropharynx  LUNGS:  No increased work of breathing  CARDIOVASCULAR: RR and normotensive  ABDOMEN:  Soft, non-distended, grimaces to palpation. No guarding, rigidity, rebound. EXTREMITIES:  MAEx4. No LE edema. Bruising over hands bilaterally  SKIN:  Warm and dry      Spine:       Spine Tenderness ROM   Cervical 6 /10 In collar   Thoracic 0 /10 Normal   Lumbar 0 /10 Normal     Musculoskeletal:    Joint Tenderness Swelling/Deformity ROM   Right shoulder absent absent normal   Left shoulder absent absent normal   Right elbow absent absent normal   Left elbow absent absent normal   Right wrist absent absent normal   Left wrist absent absent normal   Right hand grasp absent absent normal   Left hand grasp absent absent normal   Right hip absent absent normal   Left hip absent absent normal   Right knee absent absent normal   Left knee absent absent normal   Right ankle absent absent normal   Left ankle absent absent normal   Right foot absent absent normal   Left foot absent absent normal         CONSULTS: Neurosurgery, ENT      Active Problems:    SDH (subdural hematoma) (HCC)  Resolved Problems:    * No resolved hospital problems. *        Assessment/Plan:     Neuro:  SAH, and SDH stable on repeat imaging.  GCS 12. C1 ring fracture and C2 odontoid type 2 fracture. Aspen collar. Pain control. keppra IV. Awaiting neurosurgery recommendations  CV: Right vertebral artery blunt injury, low grade. ASA when ok with neurosurgery  Pulm: No acute issues. Encourage IS/SMI. GI: No acute issues. Bowel regimen. Zofran PRN. Renal: Bladder atony, large fluid filled bladder on CT. Will straight cath this morning  Endocrine: No acute issues. MSK: No acute issues. PT/OT. Heme: No acute issues. ID: No acute issues. Pain/Analgesia: 25 mcg fentanyl q4 hours  Bowel Regimen: PRN glycolax  DVT PPx:  SCDs  GI PPx:  Pepcid     Code status:  Full Code    Disposition:  Monitored floor    Xi Mauricio DO  Resident, PGY-1  4/29/2021  6:03 AM    TRAUMA HISTORY & PHYSICAL  Surgical Resident/Advance Practice Nurse  4/29/2021  1:31 AM     PRIMARY SURVEY     CHIEF COMPLAINT:  Trauma consult. Injury occurred yesterday     Patient sustained a fall at her assisted living facility. She presented to the [facility].       CT of the head demonstrating traumatic subarachnoid hemorrhage and right-sided 5 mm subdural hematoma without shift  CT of the face demonstrating nasal bone and nasal septal fracture, sphenoid sinus fracture, nondisplaced maxillary floor fracture and pneumocephalus the sella turcica  CT of the cervical spine demonstrating C1 ring fracture and C2 type II odontoid fracture     Patient was transferred to the [facility] emergency department. Patient has a GCS of 13 or 14. Occasionally able to answer questions appropriately other times mumbling. She does follow simple commands. She is not on anticoagulation. She does have significant periorbital bruising bilaterally as well as right-sided epistasis. Patient also vomited clotted blood while being evaluated.   Right nares were packed with 4 x 4 which did result in hemostasis     AIRWAY:   Airway Abnormal  Oropharynx with dried blood  EMS ETT Absent  Noisy respirations Absent  Retractions: Absent  Vomiting/bleeding: Present        BREATHING:    Midaxillary breath sound left:  Normal  Midaxillary breath sound right:  Normal     Cough sound intensity:  Patient would not cough on command  FiO2: Room air     CIRCULATION:   Femerol pulse intensity: Strong  Palpebral conjunctiva: Red            Vitals:     04/29/21 0120   BP: (!) 159/76   Pulse: 74   Resp: 18   Temp:     SpO2: 94%         Vitals        Vitals:     04/28/21 2320 04/29/21 0120   BP: (!) 165/111 (!) 159/76   Pulse: 78 74   Resp: 16 18   Temp: 97.7 °F (36.5 °C)     TempSrc: Temporal     SpO2: 98% 94%   Weight: 135 lb (61.2 kg)     Height: 5' (1.524 m)              Central Nervous System     GCS Initial 15 minutes   Eye  Motor  Verbal 3 - Opens eyes to loud noise or command  6 - Follows simple motor commands  4 - Seems confused, disoriented 3 - Opens eyes to loud noise or command  6 - Follows simple motor commands  4 - Seems confused, disoriented      Neuromuscular blockade: No  Pupil size:      Left 3 mm                          Right 3 mm  Pupil reaction: Yes     Wiggles fingers: Left Yes Right Yes  Wiggles toes: Left Yes   Right Yes     Hand grasp:   Left  Present                            Right  Present  Plantar flexion:          Left  Present                                       Right   Present     Loss of consciousness: Unknown  History Obtained From:  Patient & EMS  Private Medical Doctor: Unknown     Pre-exisiting Medical History:  yes     Conditions: Dementia, HTN     Medications: Metoprolol, ASA 81     Allergies: Amoxicillin and morphine, unknown     Social History:   Tobacco use:  none  Alcohol use:  none  Illicit drug use:  no history of illicit drug use     Past Surgical History: Unknown, patient has right-sided subcostal surgical incisional scar     Anticoagulant use:  No   Antiplatelet use:     Yes ASA 81     NSAID use in last 72 hours: yes  Taken PCN in past:  unknown  Last food/drink: Yesterday  Last tetanus: Unknown     Family History:   Not pertinent to presenting problem.       Complaints:   Head: Moderate  Neck:   Moderate  Chest:   None  Back:   None  Abdomen:   Mild  Extremities:   None  Comments:      Review of systems:  All negative unless otherwise noted.         SECONDARY SURVEY  Head/scalp: Atraumatic     Face: Bilateral periorbital ecchymosis     Eyes/ears/nose: No apparent CSF leak from auditory canals     Pharynx/mouth: Oropharynx with dried blood present     Neck: Atraumatic      Cervical spine tenderness:   Cervical collar in place at time of arrival  Pain:  moderate  ROM:  Not indicated      Chest wall:  Atraumatic     Heart:  Regular rate & rhythm     Abdomen: Atraumatic. Soft ND  Tenderness: Grimace to palpation     Pelvis: Atraumatic  Tenderness: none     Thoracolumbar spine: Atraumatic  Tenderness:  none      Extremities:   Sensory normal  Motor normal     Distal Pulses  Left arm normal  Right arm normal  Left leg normal  Right leg normal     Capillary refill  Left arm normal  Right arm normal  Left leg normal  Right leg normal     Procedures in ED:  Nasal packing     In the event of Emergency Blood Transfusion:  Due to the critical condition of this patient, I request the immediate release of blood products for emergency transfusion secondary to shock.  I understand the increased risks incurred by the lack of complete transfusion testing.       Radiology: CT Head , CT Face  and CT Cervical spine      Consultations:  Neurosurgery and OMFS     Admission/Diagnosis: SAH, SDH, facial fractures, C1 and C2 fracture     Plan of Treatment:     -Repeat head CT  -CTA of the neck  -MRI cervical spine  -CT chest  -CT abdomen pelvis  -Awaiting neurosurgery recommendations  -Awaiting ENT recommendations  -PT/OT  -Tertiary exam  -Patient will need at least to monitored floor, dispo pending results of imaging    Attending attestation     Patient seen and examined, agree with resident note, for remaining HP/Consult details please see resident HP/Consult note.       Patient seen and examined I agree with above     CC: fall     S: she is resting, but just groans when I talk to her, able to follow some simple commands,    GEN mild distress  HEENT: PERRL 3mm b/l orbital ecchymosis,   Resp non labored clear b/l   CVS RR + systolic murmur   ABD SNT obese   EXT NVI no obvious deformities, mild pedal edema   SPINE: tender C spine, t/l mild tenderness     A/P s/p fall with ICH, C1/2 fx, vert injury,     - ICH, C spine fx, NS to see, co collar, keppra,   - vert injury asa,   - smi deep breathing pain control   - home meds,   - palliative care,          Latrice Wise MD

## 2021-04-29 NOTE — ED NOTES
Pt yelling \"please leave me alone, you're going to make me worse\". Pt medicated for pain and given Keppra. Pt placed back on monitor. Unable to complete EKG at this time.       Kalie Moore RN  04/29/21 5420

## 2021-04-29 NOTE — PROGRESS NOTES
Spoke with Dr. Deon Epsp and he is stating it is alright to start the patient on ASA 81 for the right vertebral artery blunt injruy and get a repeat head CT later today

## 2021-04-29 NOTE — CONSULTS
Vascular Surgery Consultation Note    Reason for Consult:  Low grade blunt vertebral artery injury  HPI:    This is a 80 y.o. female who is admitted to the hospital for treatment of fall at her assisted living facility. Patient sustained a C1 and C2 fracture. CTA of the neck demonstrating low grade vertebral artery injury on the right. Patient also has a SAH and SDH was stable on repeat imaging. Patient is a GCS of 14. No lateralizing signs or numbness or weakness      ROS: Negative if blank [], Positive if [x]  General Vascular   [] Fevers [] Claudication (Blocks)   [] Chills [] Rest Pain   [] Weight Loss [] Tissue Loss   [] Chest Pain [] Clotting Disorder   [] SOB at rest [] Leg Swelling   [] SOB with exertion [] DVT/PE      [x] Nausea    [x] Vomiting [] Stroke/TIA   [] Abdominal Pain [] Focal weakness   [] Melena [] Slurred Speech   [] Hematochezia [] Vision Changes   [] Hematuria    [] Dysuria [] Hx of Central Catheters   [] Wears Glasses/Contacts [] Dialysis and If so date initiated   [] Blindness       [] Difficulty swallowing        Past Medical History:   Diagnosis Date    Bladder atony     Hypertension         History reviewed. No pertinent surgical history. Current Medications:    sodium chloride 100 mL/hr at 04/29/21 0148      ondansetron    levetiracetam  500 mg Intravenous Q12H        Allergies:  Amoxicillin and Morphine    Social History     Socioeconomic History    Marital status:       Spouse name: Not on file    Number of children: Not on file    Years of education: Not on file    Highest education level: Not on file   Occupational History    Not on file   Social Needs    Financial resource strain: Not on file    Food insecurity     Worry: Not on file     Inability: Not on file    Transportation needs     Medical: Not on file     Non-medical: Not on file   Tobacco Use    Smoking status: Unknown If Ever Smoked   Substance and Sexual Activity    Alcohol use: Not on file    Drug use: Not on file    Sexual activity: Not on file   Lifestyle    Physical activity     Days per week: Not on file     Minutes per session: Not on file    Stress: Not on file   Relationships    Social connections     Talks on phone: Not on file     Gets together: Not on file     Attends Yazdanism service: Not on file     Active member of club or organization: Not on file     Attends meetings of clubs or organizations: Not on file     Relationship status: Not on file    Intimate partner violence     Fear of current or ex partner: Not on file     Emotionally abused: Not on file     Physically abused: Not on file     Forced sexual activity: Not on file   Other Topics Concern    Not on file   Social History Narrative    Not on file        History reviewed. No pertinent family history.     PHYSICAL EXAM:    BP (!) 159/76   Pulse 74   Temp 97.7 °F (36.5 °C) (Temporal)   Resp 18   Ht 5' (1.524 m)   Wt 135 lb (61.2 kg)   SpO2 94%   BMI 26.37 kg/m²   CONSTITUTIONAL:  awake, alert, cooperative  NEURO:  Normal  EYES:  lids and lashes normal, sclera clear and conjunctiva normal  ENT:  normocepalic, without obvious abnormality, external ears without lesions  NECK:  supple, symmetrical, trachea midline, jugular venous distention present  LUNGS:  no increased work of breathing  CARDIOVASCULAR:  regular rate and rhythm  ABDOMEN:  soft, distended, grimace to palpation, Aorta is not palpable  SKIN: Significant bruising over the face    EXTREMITIES:    R UE Swelling absent Incisions absent       5/5 Strength    L UE Swelling absent Incisions absent       5/5 Strength    R LE Edema present     Incisions absent    Varicose veins absent    Wounds absent    5/5 Strength   Neuropathy is absent    L LE Edema present     Incisions absent    Varicose veins absent    Wounds absent    5/5 Strength   Neuropathy is absent    R brachial  L brachial    R radial 2+ L radial 2+   R femoral 2+ L femoral 2+   R popliteal  L popliteal    R posterior tibial  L posterior tibial    R dorsalis pedis  L dorsalis pedis        LABS:    Lab Results   Component Value Date    WBC 8.7 04/29/2021    HGB 13.2 04/29/2021    HCT 40.7 04/29/2021     04/29/2021    K 4.0 04/29/2021    BUN 26 (H) 04/29/2021    CREATININE 0.8 04/29/2021       RADIOLOGY:  Ct Head Wo Contrast    Result Date: 4/29/2021  EXAMINATION: CT OF THE HEAD WITHOUT CONTRAST  4/29/2021 1:25 am TECHNIQUE: CT of the head was performed without the administration of intravenous contrast. Dose modulation, iterative reconstruction, and/or weight based adjustment of the mA/kV was utilized to reduce the radiation dose to as low as reasonably achievable. COMPARISON: 04/28/2021 HISTORY: ORDERING SYSTEM PROVIDED HISTORY: Evaluate SAH and SDH, compare to previous outside imaging FINDINGS: BRAIN/VENTRICLES: No significant interval change in the previously visualized subdural hematoma along the right frontal convexity and subarachnoid hemorrhage in bilateral inferior frontal lobes. No significant mass effect or midline shift. There is prominence of the ventricles and sulci due to global parenchymal volume loss. There are nonspecific areas of hypoattenuation within the periventricular and subcortical white matter, which likely represent chronic microvascular ischemic change. ORBITS: The visualized portion of the orbits demonstrate no acute abnormality. SINUSES: Air-fluid levels in the paranasal sinuses. Mastoid air cells are clear. SOFT TISSUES/SKULL: Bilateral nasal bone fractures are present. Anterior scalp hematoma     No significant change in the previously visualized subdural hematoma along the right frontal convexity and subarachnoid hemorrhage in bilateral inferior frontal lobes. No significant mass effect or midline shift. Air-fluid levels in the paranasal sinuses. Anterior scalp hematoma.      Ct Chest W Contrast    Result Date: 4/29/2021  EXAMINATION: CT OF THE CHEST WITH CONTRAST; CT OF THE Bones/Soft Tissues: No acute bony pathology. Status post internal fixation of the left hip with chronic remodeling. No evidence of acute pelvic or hip fracture. Prominent degenerative changes of the lumbar spine. Compression deformity of L5, likely chronic. No evidence of acute thoracic, abdominal or pelvic pathology. Cardiomegaly with coronary atherosclerotic calcifications. Compression deformity of L5, likely chronic. Other chronic or incidental findings as noted. Ct Abdomen Pelvis W Iv Contrast Additional Contrast? None    Result Date: 4/29/2021  EXAMINATION: CT OF THE CHEST WITH CONTRAST; CT OF THE ABDOMEN AND PELVIS WITH CONTRAST 4/29/2021 12:25 am TECHNIQUE: CT of the chest was performed with the administration of intravenous contrast. Multiplanar reformatted images are provided for review. Dose modulation, iterative reconstruction, and/or weight based adjustment of the mA/kV was utilized to reduce the radiation dose to as low as reasonably achievable.; CT of the abdomen and pelvis was performed with the administration of intravenous contrast. Multiplanar reformatted images are provided for review. Dose modulation, iterative reconstruction, and/or weight based adjustment of the mA/kV was utilized to reduce the radiation dose to as low as reasonably achievable. COMPARISON: None HISTORY: ORDERING SYSTEM PROVIDED HISTORY: trauma TECHNOLOGIST PROVIDED HISTORY: Reason for exam:->trauma Decision Support Exception->Emergency Medical Condition (MA) What reading provider will be dictating this exam?->CRC FINDINGS: Chest: Mediastinum: No evidence of mediastinal hematoma. No aortic dissection or aneurysmal dilatation. No hilar or mediastinal adenopathy. No pericardial effusion. Cardiomegaly with coronary atherosclerotic calcifications. Lungs/pleura: No evidence of pneumothorax, hemothorax, or lung contusion. Bilateral basilar atelectatic changes. Soft Tissues/Bones: No acute rib fracture.  Prominent degenerative changes of the right glenohumeral joint. Prominent degenerative changes of the thoracic spine. Abdomen/Pelvis: Organs: The liver, spleen, pancreas are within normal limits. Status post cholecystectomy. Tiny bilateral renal cysts. Mild nodular enlargement of bilateral adrenal glands, most likely adenoma. No evidence of intra-abdominal visceral injury. GI/Bowel: No bowel obstruction or free intraperitoneal air. No mesenteric hematoma. Pelvis: Urinary bladder within normal limits. Status post hysterectomy. No free fluid in the pelvis or hemoperitoneum. Peritoneum/Retroperitoneum: No retroperitoneal hematoma. Bones/Soft Tissues: No acute bony pathology. Status post internal fixation of the left hip with chronic remodeling. No evidence of acute pelvic or hip fracture. Prominent degenerative changes of the lumbar spine. Compression deformity of L5, likely chronic. No evidence of acute thoracic, abdominal or pelvic pathology. Cardiomegaly with coronary atherosclerotic calcifications. Compression deformity of L5, likely chronic. Other chronic or incidental findings as noted. Cta Neck W Contrast    Result Date: 4/29/2021  EXAMINATION: CTA OF THE NECK 4/29/2021 1:25 am TECHNIQUE: CTA of the neck was performed with the administration of intravenous contrast. Multiplanar reformatted images are provided for review. MIP images are provided for review. Stenosis of the internal carotid arteries measured using NASCET criteria. Dose modulation, iterative reconstruction, and/or weight based adjustment of the mA/kV was utilized to reduce the radiation dose to as low as reasonably achievable. COMPARISON: None. HISTORY: ORDERING SYSTEM PROVIDED HISTORY: trauma FINDINGS: AORTIC ARCH/ARCH VESSELS: No dissection or arterial injury. No significant stenosis of the brachiocephalic or subclavian arteries. CAROTID ARTERIES: Mild atherosclerosis in carotid bifurcations.   No dissection, arterial injury, or hemodynamically significant stenosis by NASCET criteria. VERTEBRAL ARTERIES: Mild luminal irregularity of right vertebral artery at C1-C2 level. Left vertebral artery is unremarkable. SOFT TISSUES: The lung apices are clear. No cervical or superior mediastinal lymphadenopathy. The larynx and pharynx are unremarkable. No acute abnormality of the salivary and thyroid glands. BONES: Nondisplaced fractures through the odontoid process and posterior arch of C1. Nondisplaced fractures through the odontoid process and posterior arch of C1. Mild luminal irregularity of right vertebral artery at C1-C2 level is suggestive low-grade arterial injury.          Assesment/Plan  80 y.o. female with right-sided low-grade blunt vertebral artery injury in the setting of intracranial hemorrhage    -Recommend starting aspirin 81 when alright by neurosurgery  -No emergent vascular surgery intervention warranted at this time    Plan discussed with Dr. Benjamin Moura DO  4/29/21  3:01 AM EDT

## 2021-04-29 NOTE — H&P
TRAUMA HISTORY & PHYSICAL  Surgical Resident/Advance Practice Nurse  4/29/2021  1:31 AM    PRIMARY SURVEY    CHIEF COMPLAINT:  Trauma consult. Injury occurred yesterday    Patient sustained a fall at her assisted living facility. She presented to the [facility].      CT of the head demonstrating traumatic subarachnoid hemorrhage and right-sided 5 mm subdural hematoma without shift  CT of the face demonstrating nasal bone and nasal septal fracture, sphenoid sinus fracture, nondisplaced maxillary floor fracture and pneumocephalus the sella turcica  CT of the cervical spine demonstrating C1 ring fracture and C2 type II odontoid fracture    Patient was transferred to the [facility] emergency department. Patient has a GCS of 13 or 14. Occasionally able to answer questions appropriately other times mumbling. She does follow simple commands. She is not on anticoagulation. She does have significant periorbital bruising bilaterally as well as right-sided epistasis. Patient also vomited clotted blood while being evaluated.   Right nares were packed with 4 x 4 which did result in hemostasis    AIRWAY:   Airway Abnormal  Oropharynx with dried blood  EMS ETT Absent  Noisy respirations Absent  Retractions: Absent  Vomiting/bleeding: Present      BREATHING:    Midaxillary breath sound left:  Normal  Midaxillary breath sound right:  Normal    Cough sound intensity:  Patient would not cough on command  FiO2: Room air    CIRCULATION:   Femerol pulse intensity: Strong  Palpebral conjunctiva: Red      Vitals:    04/29/21 0120   BP: (!) 159/76   Pulse: 74   Resp: 18   Temp:    SpO2: 94%       Vitals:    04/28/21 2320 04/29/21 0120   BP: (!) 165/111 (!) 159/76   Pulse: 78 74   Resp: 16 18   Temp: 97.7 °F (36.5 °C)    TempSrc: Temporal    SpO2: 98% 94%   Weight: 135 lb (61.2 kg)    Height: 5' (1.524 m)         Central Nervous System    GCS Initial 15 minutes   Eye  Motor  Verbal 3 - Opens eyes to loud noise or command  6 - Follows simple motor commands  4 - Seems confused, disoriented 3 - Opens eyes to loud noise or command  6 - Follows simple motor commands  4 - Seems confused, disoriented     Neuromuscular blockade: No  Pupil size:  Left 3 mm    Right 3 mm  Pupil reaction: Yes    Wiggles fingers: Left Yes Right Yes  Wiggles toes: Left Yes   Right Yes    Hand grasp:   Left  Present      Right  Present  Plantar flexion: Left  Present      Right   Present    Loss of consciousness: Unknown  History Obtained From:  Patient & EMS  Private Medical Doctor: Unknown    Pre-exisiting Medical History:  yes    Conditions: Dementia, HTN    Medications: Metoprolol, ASA 81    Allergies: Amoxicillin and morphine, unknown    Social History:   Tobacco use:  none  Alcohol use:  none  Illicit drug use:  no history of illicit drug use    Past Surgical History: Unknown, patient has right-sided subcostal surgical incisional scar    Anticoagulant use:  No   Antiplatelet use: Yes ASA 81    NSAID use in last 72 hours: yes  Taken PCN in past:  unknown  Last food/drink: Yesterday  Last tetanus: Unknown    Family History:   Not pertinent to presenting problem. Complaints:   Head: Moderate  Neck:   Moderate  Chest:   None  Back:   None  Abdomen:   Mild  Extremities:   None  Comments:     Review of systems:  All negative unless otherwise noted. SECONDARY SURVEY  Head/scalp: Atraumatic    Face: Bilateral periorbital ecchymosis    Eyes/ears/nose: No apparent CSF leak from auditory canals    Pharynx/mouth: Oropharynx with dried blood present    Neck: Atraumatic     Cervical spine tenderness:   Cervical collar in place at time of arrival  Pain:  moderate  ROM:  Not indicated     Chest wall:  Atraumatic    Heart:  Regular rate & rhythm    Abdomen: Atraumatic.  Soft ND  Tenderness: Grimace to palpation    Pelvis: Atraumatic  Tenderness: none    Thoracolumbar spine: Atraumatic  Tenderness:  none     Extremities:   Sensory normal  Motor normal    Distal Pulses  Left arm normal  Right arm normal  Left leg normal  Right leg normal    Capillary refill  Left arm normal  Right arm normal  Left leg normal  Right leg normal    Procedures in ED:  Nasal packing    In the event of Emergency Blood Transfusion:  Due to the critical condition of this patient, I request the immediate release of blood products for emergency transfusion secondary to shock. I understand the increased risks incurred by the lack of complete transfusion testing.       Radiology: CT Head , CT Face  and CT Cervical spine     Consultations:  Neurosurgery and OMFS    Admission/Diagnosis: SAH, SDH, facial fractures, C1 and C2 fracture    Plan of Treatment:    -Repeat head CT  -CTA of the neck  -MRI cervical spine  -CT chest  -CT abdomen pelvis  -Awaiting neurosurgery recommendations  -Awaiting ENT recommendations  -PT/OT  -Tertiary exam  -Patient will need at least to monitored floor, dispo pending results of imaging    Plan discussed with Dr. Jacqueline Keene at 4/29/2021 on 1:31 AM    Electronically signed by Barbie Kowalski DO on 4/29/2021 at 1:31 AM

## 2021-04-30 ENCOUNTER — APPOINTMENT (OUTPATIENT)
Dept: CT IMAGING | Age: 86
DRG: 083 | End: 2021-04-30
Payer: MEDICARE

## 2021-04-30 LAB
ALBUMIN SERPL-MCNC: 3.1 G/DL (ref 3.5–5.2)
ALP BLD-CCNC: 57 U/L (ref 35–104)
ALT SERPL-CCNC: 20 U/L (ref 0–32)
ANION GAP SERPL CALCULATED.3IONS-SCNC: 5 MMOL/L (ref 7–16)
AST SERPL-CCNC: 17 U/L (ref 0–31)
BASOPHILS ABSOLUTE: 0.01 E9/L (ref 0–0.2)
BASOPHILS RELATIVE PERCENT: 0.2 % (ref 0–2)
BILIRUB SERPL-MCNC: 1 MG/DL (ref 0–1.2)
BUN BLDV-MCNC: 22 MG/DL (ref 6–23)
CALCIUM SERPL-MCNC: 9 MG/DL (ref 8.6–10.2)
CHLORIDE BLD-SCNC: 108 MMOL/L (ref 98–107)
CO2: 31 MMOL/L (ref 22–29)
CREAT SERPL-MCNC: 0.7 MG/DL (ref 0.5–1)
EOSINOPHILS ABSOLUTE: 0.01 E9/L (ref 0.05–0.5)
EOSINOPHILS RELATIVE PERCENT: 0.2 % (ref 0–6)
GFR AFRICAN AMERICAN: >60
GFR NON-AFRICAN AMERICAN: >60 ML/MIN/1.73
GLUCOSE BLD-MCNC: 130 MG/DL (ref 74–99)
HCT VFR BLD CALC: 34.6 % (ref 34–48)
HEMOGLOBIN: 11 G/DL (ref 11.5–15.5)
IMMATURE GRANULOCYTES #: 0.03 E9/L
IMMATURE GRANULOCYTES %: 0.5 % (ref 0–5)
LYMPHOCYTES ABSOLUTE: 1.14 E9/L (ref 1.5–4)
LYMPHOCYTES RELATIVE PERCENT: 18.9 % (ref 20–42)
MAGNESIUM: 2 MG/DL (ref 1.6–2.6)
MCH RBC QN AUTO: 31.9 PG (ref 26–35)
MCHC RBC AUTO-ENTMCNC: 31.8 % (ref 32–34.5)
MCV RBC AUTO: 100.3 FL (ref 80–99.9)
MONOCYTES ABSOLUTE: 0.51 E9/L (ref 0.1–0.95)
MONOCYTES RELATIVE PERCENT: 8.4 % (ref 2–12)
NEUTROPHILS ABSOLUTE: 4.34 E9/L (ref 1.8–7.3)
NEUTROPHILS RELATIVE PERCENT: 71.8 % (ref 43–80)
PDW BLD-RTO: 14.6 FL (ref 11.5–15)
PLATELET # BLD: 127 E9/L (ref 130–450)
PMV BLD AUTO: 10.6 FL (ref 7–12)
POTASSIUM REFLEX MAGNESIUM: 3.4 MMOL/L (ref 3.5–5)
RBC # BLD: 3.45 E12/L (ref 3.5–5.5)
SODIUM BLD-SCNC: 144 MMOL/L (ref 132–146)
TOTAL PROTEIN: 5.4 G/DL (ref 6.4–8.3)
WBC # BLD: 6 E9/L (ref 4.5–11.5)

## 2021-04-30 PROCEDURE — 83735 ASSAY OF MAGNESIUM: CPT

## 2021-04-30 PROCEDURE — 6360000002 HC RX W HCPCS: Performed by: STUDENT IN AN ORGANIZED HEALTH CARE EDUCATION/TRAINING PROGRAM

## 2021-04-30 PROCEDURE — 85025 COMPLETE CBC W/AUTO DIFF WBC: CPT

## 2021-04-30 PROCEDURE — 2700000000 HC OXYGEN THERAPY PER DAY

## 2021-04-30 PROCEDURE — 99222 1ST HOSP IP/OBS MODERATE 55: CPT | Performed by: PHYSICIAN ASSISTANT

## 2021-04-30 PROCEDURE — 6360000002 HC RX W HCPCS: Performed by: PHYSICIAN ASSISTANT

## 2021-04-30 PROCEDURE — 1200000000 HC SEMI PRIVATE

## 2021-04-30 PROCEDURE — 80053 COMPREHEN METABOLIC PANEL: CPT

## 2021-04-30 PROCEDURE — 99232 SBSQ HOSP IP/OBS MODERATE 35: CPT | Performed by: SURGERY

## 2021-04-30 PROCEDURE — 2580000003 HC RX 258: Performed by: STUDENT IN AN ORGANIZED HEALTH CARE EDUCATION/TRAINING PROGRAM

## 2021-04-30 PROCEDURE — 36415 COLL VENOUS BLD VENIPUNCTURE: CPT

## 2021-04-30 PROCEDURE — 70450 CT HEAD/BRAIN W/O DYE: CPT

## 2021-04-30 PROCEDURE — 2500000003 HC RX 250 WO HCPCS: Performed by: STUDENT IN AN ORGANIZED HEALTH CARE EDUCATION/TRAINING PROGRAM

## 2021-04-30 RX ORDER — CITALOPRAM 20 MG/1
20 TABLET ORAL DAILY
Status: DISCONTINUED | OUTPATIENT
Start: 2021-04-30 | End: 2021-05-03 | Stop reason: HOSPADM

## 2021-04-30 RX ORDER — HALOPERIDOL 5 MG/ML
2 INJECTION INTRAMUSCULAR EVERY 8 HOURS PRN
Status: DISCONTINUED | OUTPATIENT
Start: 2021-04-30 | End: 2021-05-03 | Stop reason: HOSPADM

## 2021-04-30 RX ORDER — IPRATROPIUM BROMIDE AND ALBUTEROL SULFATE 2.5; .5 MG/3ML; MG/3ML
1 SOLUTION RESPIRATORY (INHALATION)
Status: DISCONTINUED | OUTPATIENT
Start: 2021-05-01 | End: 2021-05-03 | Stop reason: HOSPADM

## 2021-04-30 RX ORDER — METOPROLOL SUCCINATE 100 MG/1
100 TABLET, EXTENDED RELEASE ORAL DAILY
Status: DISCONTINUED | OUTPATIENT
Start: 2021-04-30 | End: 2021-05-03 | Stop reason: HOSPADM

## 2021-04-30 RX ORDER — LIOTHYRONINE SODIUM 5 UG/1
5 TABLET ORAL DAILY
Status: DISCONTINUED | OUTPATIENT
Start: 2021-04-30 | End: 2021-05-03 | Stop reason: HOSPADM

## 2021-04-30 RX ORDER — LORAZEPAM 2 MG/ML
0.5 INJECTION INTRAMUSCULAR EVERY 6 HOURS PRN
Status: DISCONTINUED | OUTPATIENT
Start: 2021-04-30 | End: 2021-05-03 | Stop reason: HOSPADM

## 2021-04-30 RX ORDER — ACETAMINOPHEN 650 MG/1
650 SUPPOSITORY RECTAL EVERY 4 HOURS PRN
Status: DISCONTINUED | OUTPATIENT
Start: 2021-04-30 | End: 2021-05-03 | Stop reason: HOSPADM

## 2021-04-30 RX ADMIN — FENTANYL CITRATE 25 MCG: 50 INJECTION, SOLUTION INTRAMUSCULAR; INTRAVENOUS at 02:06

## 2021-04-30 RX ADMIN — FAMOTIDINE 10 MG: 10 INJECTION INTRAVENOUS at 22:00

## 2021-04-30 RX ADMIN — HALOPERIDOL LACTATE 2 MG: 5 INJECTION INTRAMUSCULAR at 22:00

## 2021-04-30 RX ADMIN — FENTANYL CITRATE 25 MCG: 50 INJECTION, SOLUTION INTRAMUSCULAR; INTRAVENOUS at 16:37

## 2021-04-30 RX ADMIN — LEVETIRACETAM 500 MG: 5 INJECTION INTRAVENOUS at 14:05

## 2021-04-30 RX ADMIN — FENTANYL CITRATE 25 MCG: 50 INJECTION, SOLUTION INTRAMUSCULAR; INTRAVENOUS at 11:20

## 2021-04-30 RX ADMIN — Medication 10 ML: at 22:02

## 2021-04-30 RX ADMIN — FAMOTIDINE 10 MG: 10 INJECTION INTRAVENOUS at 10:07

## 2021-04-30 RX ADMIN — LEVETIRACETAM 500 MG: 5 INJECTION INTRAVENOUS at 01:07

## 2021-04-30 ASSESSMENT — PAIN SCALES - PAIN ASSESSMENT IN ADVANCED DEMENTIA (PAINAD)
BODYLANGUAGE: 2
CONSOLABILITY: 1
NEGVOCALIZATION: 1

## 2021-04-30 ASSESSMENT — PAIN SCALES - GENERAL
PAINLEVEL_OUTOF10: 7
PAINLEVEL_OUTOF10: 10
PAINLEVEL_OUTOF10: 0

## 2021-04-30 NOTE — PROGRESS NOTES
OT SESSION ATTEMPT     Date:2021  Patient Name: Earlene Miller  MRN: [id number]  : 1927  Room: [address] [place], [place] [address]     Attempted OT session this date:    [] unavailable due to other medical staff currently with pt   [x] on hold, await neurosurgical recommendations. [] on hold per nursing staff secondary to lab / radiology results    [] declined Occupational Therapy  this date due to ___. Benefits of participation in therapy reviewed with pt.    [] off unit   [] Other:     Will reattempt OT eval at a later time.     Gonzalo Sheikh [facility] [id number]

## 2021-04-30 NOTE — PROGRESS NOTES
Dr. Yue Massey notified that patient code status needs changed to [facility].  Paperwork from [facility] is present in the soft chart

## 2021-04-30 NOTE — CARE COORDINATION
Care Coordination/Discharge planning  Met with son at bedside to assess for discharge planning needs. She is admitted post fall in her AL apartment at :[facility] with dx of subdural and subarachnoid bleeds and C1-2 fracture. Neurosurgery following with plan pending review of MRI. Patient not alert or responding  at this time. Per son patient has dementia . She was non ambulatory , could move independently  in a  wheelchair and required assist in bathing and dressing from the [place] staff. Family agree that patient will go to [facility] at time of discharge. Referral called to ANN Grier this date and  left requesting she follow case and await input from JON to start precert. Precert will be required. HENS and ambulance form complete and in envelop on soft chart.    SW to follow

## 2021-04-30 NOTE — PLAN OF CARE
Problem: Falls - Risk of:  Goal: Will remain free from falls  Description: Will remain free from falls  Outcome: Met This Shift  Goal: Absence of physical injury  Description: Absence of physical injury  Outcome: Met This Shift     Problem: Pain:  Goal: Pain level will decrease  Description: Pain level will decrease  Outcome: Met This Shift  Goal: Control of acute pain  Description: Control of acute pain  Outcome: Met This Shift  Goal: Control of chronic pain  Description: Control of chronic pain  Outcome: Met This Shift     Problem: Skin Integrity:  Goal: Will show no infection signs and symptoms  Description: Will show no infection signs and symptoms  Outcome: Met This Shift  Goal: Absence of new skin breakdown  Description: Absence of new skin breakdown  Outcome: Met This Shift     Problem: Non-Violent Restraints  Goal: Removal from restraints as soon as assessed to be safe  Outcome: Met This Shift  Goal: No harm/injury to patient while restraints in use  Outcome: Met This Shift  Goal: Patient's dignity will be maintained  Outcome: Met This Shift

## 2021-04-30 NOTE — CONSULTS
Palliative Care Department  [phone]  Palliative Care Initial Consult  Provider Yaniv Valles PA-C    Rolly Cates  [phone]  Hospital Day: 3    Referring Provider: Kandi Reno MD  Palliative Medicine was consulted for assistance with: Goals of care and CODE STATUS, family support    CHIEF COMPLAINT: Fall    ASSESSMENT/PLAN:     Pertinent hospital diagnoses:  1. Fall /sub-dural hematoma  -Neurosurgical intervention, comfort  2. Altered mental status/agitation  -Provide as needed medications for agitation including Haldol and Ativan for comfort  3. Pain after trauma with multiple fractures cervical/skull/nasal  -Provide as needed dilaudid for any pain or shortness of breath patient may experience    PALLIATIVE CARE ENCOUNTER  - Outcome of goals of care meeting:   -As needed medications for symptoms for comfort as patient awaiting discharge to skilled facility  - Capacity: At this time, Rolly Cates, Does Not have capacity for medical decision-making. Capacity is time limited and situation/question specific  - Surrogate decision maker/Legal NOK:    -Daughter Elvis Frias [phone]   -Daughter Stevenson Cramer [phone]  - Code Status:   DNR-CC  - Advanced directives: none, has CC form  - Spiritual assessment: no needs identified  - Bereavement and grief: no issues identified    - DISPO: placement    Referrals to: none today    HPI:   Rolly Cates is a 80 y.o. with a past medical history of hypertension who presented to an outside hospital and was transferred to the emergency department in [facility] from assisted living with fall. Patient completed workup in the ED and was admitted on 4/28/2021 with diagnosis(ses) of fall, subarachnoid hemorrhage, closed fracture of cervical vertebra and closed fracture facial bone. Neurosurgery consulted, no interventions. Patient CODE STATUS changed to [facility] and planning for patient to return to nursing home.     I spoke with patient's daughter David English and Binu Guevara.  They identify worsening cognitive function before fall but still residing in assisted living. She has a DNR-CC form on file  I reviewed the goals of care with her daughter Иван Alejandra specifically. The patient had completed a DNR-CC form. They are interested in having her symptoms managed and see how she recovers. They are aware she will likely not be able to return to assisted living. If she recovers and is able they would like to participate with therapy to recover as much function as she can however if she does not improve her level of consciousness today will likely wish to pursue comfort measures for her. Past Medical History:   Diagnosis Date    Bladder atony     Hypertension      History reviewed. No pertinent surgical history. Inpatient medications reviewed: yes  Home Medications reviewed: yes    Allergies   Allergen Reactions    Amoxicillin     Morphine      History reviewed. No pertinent family history. Social history:  [place] status: no  Marital status:    Living status: assisted living   Work history: unknown  Tobacco use: unknown  Drug use: unknown  Alcohol use: unknown    Review of Systems:  As per HPI, remaining review of systems negative/unremarkable    OBJECTIVE:   Prognosis: unknown    Physical Exam:  /75   Pulse 84   Temp 98.7 °F (37.1 °C) (Temporal)   Resp 18   Ht 5' (1.524 m)   Wt 135 lb (61.2 kg)   SpO2 96%   BMI 26.37 kg/m²   Gen:  Elderly, sleeping reported agitated  HEENT: Facial ecchymoses diffuse with periorbital edema, lips very dry and frontal hematoma   Neck: Cervical collar  Lungs: Significantly diminished breath sounds bilaterally throughout lungs, abdominal tugging but respiratory rate normal prolonged expiratory phase  Heart[de-identified]  RRR, distant heart tones, loud blowing murmur appreciated   abd:  Soft, non distended  : Deferred  Ext: No pitting edema of lower extremities   skin: Scattered ecchymoses   neuro: Sleeping currently was previously agitated did not wake    Objective data reviewed: labs, images, records, medication use, vitals and chart  Relevant data: Per HPI    Time/Communication  Greater than 50% of time spent, total 50 minutes in counseling and coordination of care at the bedside/over the telephone regarding goals of care, symptom management, diagnosis and prognosis and see above. Thank you for allowing Palliative Medicine to participate in the care of [facility]. Provider Kellie Geiger PA-C  Palliative Medicine    Note: This report was completed using computerize voiced recognition software. Every effort has been made to ensure accuracy; however, inadvertent computerized transcription errors may be present.

## 2021-04-30 NOTE — PROGRESS NOTES
Physical Therapy    PT order received and medical chart reviewed 4/30 AM. Awaiting neurosurgical recommendations/POC. Will follow. Thank you.     Jael Leos, PT, DPT  [id number]

## 2021-04-30 NOTE — PROGRESS NOTES
Dr Ornelas [place] perfect served that Angel Luis Jiménez is congested and no breathing treatment ordered.

## 2021-04-30 NOTE — CONSULTS
[address]                  [address] [facility],  [facility]                                  CONSULTATION    PATIENT NAME: Theo Lopez                        :        1927  MED REC NO:   [id number]                            ROOM:       [id number]  ACCOUNT NO:   [de-identified]                           ADMIT DATE: 2021  PROVIDER:     Binh Tobias MD    CONSULT DATE:  2021    CHIEF COMPLAINT:  History of fall. HISTORY OF PRESENT ILLNESS:  This is a 79-year-old female who apparently  fell at her assisted-living facility. She was taken to [facility].  She had a CT scan of the brain demonstrating a  traumatic subarachnoid hemorrhage and right sided 5-mm subdural hematoma  without mass effect. CT scan of the face demonstrated nasal fracture  and nasal septal fracture. She denies sinus fracture. CT scan of the  cervical spine demonstrated C1 ring fracture and C2 type II odontoid  fracture. The patient was transferred to [facility] for further  management. The patient's Tornillo coma score on admission was 13. She  is not on any anticoagulants. .  The CT scan of the brain was repeated,  which was reviewed and did not show any significant change in the  findings. The Tornillo coma score on admission was 13, one less for  confusion and not opening her eyes to command. PAST MEDICAL HISTORY:  Bladder atony and hypertension and dementia. PAST SURGICAL HISTORY:  Unknown, had right-sided subcostal surgical  incisional scar. MEDICATIONS:  She is taking metoprolol and aspirin. ALLERGIES:  She is allergic to AMOXICILLIN, MORPHINE.    PHYSICAL EXAMINATION:  GENERAL:  She is a well-developed, well-nourished female, in no acute  distress. NEUROLOGIC:  She is  obtunded but & does not follow commands. She has bilateral periorbital ecchymosis. Pupils are equal and  reactive.   She does move all the extremities to stimuli and is moaning. NECK:  Neck is in hard collar. IMPRESSION:  Subdural and subarachnoid hemorrhage, left frontal lobe. She has fracture of the arch of C1 and fractured odontoid process of C2. Multiple facial fracture. Multiple medical comorbidities. Recommend  MRI scan of the cervical spine for further evaluation. We will make  further recommendation after reviewing the same.         Radha Mcallister MD    D: 04/29/2021 16:53:58       T: 04/29/2021 17:02:11     [email or web address]  Job#: [id number]     Doc#: [id number]    CC:

## 2021-05-01 LAB
ALBUMIN SERPL-MCNC: 3.2 G/DL (ref 3.5–5.2)
ALP BLD-CCNC: 59 U/L (ref 35–104)
ALT SERPL-CCNC: 19 U/L (ref 0–32)
ANION GAP SERPL CALCULATED.3IONS-SCNC: 9 MMOL/L (ref 7–16)
AST SERPL-CCNC: 19 U/L (ref 0–31)
BASOPHILS ABSOLUTE: 0.02 E9/L (ref 0–0.2)
BASOPHILS RELATIVE PERCENT: 0.3 % (ref 0–2)
BILIRUB SERPL-MCNC: 1.5 MG/DL (ref 0–1.2)
BUN BLDV-MCNC: 19 MG/DL (ref 6–23)
CALCIUM SERPL-MCNC: 8.9 MG/DL (ref 8.6–10.2)
CHLORIDE BLD-SCNC: 108 MMOL/L (ref 98–107)
CO2: 27 MMOL/L (ref 22–29)
CREAT SERPL-MCNC: 0.7 MG/DL (ref 0.5–1)
EOSINOPHILS ABSOLUTE: 0 E9/L (ref 0.05–0.5)
EOSINOPHILS RELATIVE PERCENT: 0 % (ref 0–6)
GFR AFRICAN AMERICAN: >60
GFR NON-AFRICAN AMERICAN: >60 ML/MIN/1.73
GLUCOSE BLD-MCNC: 127 MG/DL (ref 74–99)
HCT VFR BLD CALC: 37.7 % (ref 34–48)
HEMOGLOBIN: 11.7 G/DL (ref 11.5–15.5)
IMMATURE GRANULOCYTES #: 0.04 E9/L
IMMATURE GRANULOCYTES %: 0.5 % (ref 0–5)
LYMPHOCYTES ABSOLUTE: 1.16 E9/L (ref 1.5–4)
LYMPHOCYTES RELATIVE PERCENT: 15.8 % (ref 20–42)
MAGNESIUM: 2.1 MG/DL (ref 1.6–2.6)
MCH RBC QN AUTO: 31.2 PG (ref 26–35)
MCHC RBC AUTO-ENTMCNC: 31 % (ref 32–34.5)
MCV RBC AUTO: 100.5 FL (ref 80–99.9)
MONOCYTES ABSOLUTE: 0.57 E9/L (ref 0.1–0.95)
MONOCYTES RELATIVE PERCENT: 7.8 % (ref 2–12)
NEUTROPHILS ABSOLUTE: 5.56 E9/L (ref 1.8–7.3)
NEUTROPHILS RELATIVE PERCENT: 75.6 % (ref 43–80)
PDW BLD-RTO: 14.3 FL (ref 11.5–15)
PLATELET # BLD: 143 E9/L (ref 130–450)
PMV BLD AUTO: 10.6 FL (ref 7–12)
POTASSIUM REFLEX MAGNESIUM: 3.5 MMOL/L (ref 3.5–5)
RBC # BLD: 3.75 E12/L (ref 3.5–5.5)
SODIUM BLD-SCNC: 144 MMOL/L (ref 132–146)
TOTAL PROTEIN: 6 G/DL (ref 6.4–8.3)
WBC # BLD: 7.4 E9/L (ref 4.5–11.5)

## 2021-05-01 PROCEDURE — 2500000003 HC RX 250 WO HCPCS: Performed by: STUDENT IN AN ORGANIZED HEALTH CARE EDUCATION/TRAINING PROGRAM

## 2021-05-01 PROCEDURE — 6360000002 HC RX W HCPCS: Performed by: STUDENT IN AN ORGANIZED HEALTH CARE EDUCATION/TRAINING PROGRAM

## 2021-05-01 PROCEDURE — 85025 COMPLETE CBC W/AUTO DIFF WBC: CPT

## 2021-05-01 PROCEDURE — 6370000000 HC RX 637 (ALT 250 FOR IP): Performed by: STUDENT IN AN ORGANIZED HEALTH CARE EDUCATION/TRAINING PROGRAM

## 2021-05-01 PROCEDURE — 2580000003 HC RX 258: Performed by: STUDENT IN AN ORGANIZED HEALTH CARE EDUCATION/TRAINING PROGRAM

## 2021-05-01 PROCEDURE — 36415 COLL VENOUS BLD VENIPUNCTURE: CPT

## 2021-05-01 PROCEDURE — 1200000000 HC SEMI PRIVATE

## 2021-05-01 PROCEDURE — 80053 COMPREHEN METABOLIC PANEL: CPT

## 2021-05-01 PROCEDURE — 2700000000 HC OXYGEN THERAPY PER DAY

## 2021-05-01 PROCEDURE — 94664 DEMO&/EVAL PT USE INHALER: CPT

## 2021-05-01 PROCEDURE — 94640 AIRWAY INHALATION TREATMENT: CPT

## 2021-05-01 PROCEDURE — 99233 SBSQ HOSP IP/OBS HIGH 50: CPT | Performed by: STUDENT IN AN ORGANIZED HEALTH CARE EDUCATION/TRAINING PROGRAM

## 2021-05-01 PROCEDURE — 99232 SBSQ HOSP IP/OBS MODERATE 35: CPT | Performed by: SURGERY

## 2021-05-01 PROCEDURE — 83735 ASSAY OF MAGNESIUM: CPT

## 2021-05-01 PROCEDURE — 6360000002 HC RX W HCPCS: Performed by: PHYSICIAN ASSISTANT

## 2021-05-01 RX ADMIN — LEVETIRACETAM 500 MG: 5 INJECTION INTRAVENOUS at 01:58

## 2021-05-01 RX ADMIN — LORAZEPAM 0.5 MG: 2 INJECTION INTRAMUSCULAR; INTRAVENOUS at 03:03

## 2021-05-01 RX ADMIN — HYDROMORPHONE HYDROCHLORIDE 0.6 MG: 1 INJECTION, SOLUTION INTRAMUSCULAR; INTRAVENOUS; SUBCUTANEOUS at 13:47

## 2021-05-01 RX ADMIN — LORAZEPAM 0.5 MG: 2 INJECTION INTRAMUSCULAR; INTRAVENOUS at 12:48

## 2021-05-01 RX ADMIN — LEVETIRACETAM 500 MG: 5 INJECTION INTRAVENOUS at 14:17

## 2021-05-01 RX ADMIN — IPRATROPIUM BROMIDE AND ALBUTEROL SULFATE 1 AMPULE: .5; 3 SOLUTION RESPIRATORY (INHALATION) at 16:25

## 2021-05-01 RX ADMIN — IPRATROPIUM BROMIDE AND ALBUTEROL SULFATE 1 AMPULE: .5; 3 SOLUTION RESPIRATORY (INHALATION) at 08:56

## 2021-05-01 RX ADMIN — HYDROMORPHONE HYDROCHLORIDE 1 MG: 1 INJECTION, SOLUTION INTRAMUSCULAR; INTRAVENOUS; SUBCUTANEOUS at 16:03

## 2021-05-01 RX ADMIN — FAMOTIDINE 10 MG: 10 INJECTION INTRAVENOUS at 20:54

## 2021-05-01 RX ADMIN — Medication 10 ML: at 20:54

## 2021-05-01 RX ADMIN — IPRATROPIUM BROMIDE AND ALBUTEROL SULFATE 1 AMPULE: .5; 3 SOLUTION RESPIRATORY (INHALATION) at 12:09

## 2021-05-01 ASSESSMENT — PAIN SCALES - GENERAL: PAINLEVEL_OUTOF10: 0

## 2021-05-01 ASSESSMENT — PAIN SCALES - WONG BAKER
WONGBAKER_NUMERICALRESPONSE: 8
WONGBAKER_NUMERICALRESPONSE: 8

## 2021-05-01 ASSESSMENT — PAIN DESCRIPTION - DESCRIPTORS: DESCRIPTORS: PATIENT UNABLE TO DESCRIBE

## 2021-05-01 NOTE — CARE COORDINATION
Spoke with family. They understand that she will need to be private pay when she returns to [facility] and this is not a concern for them. Also spoke with [facility] and they will have a long term bed when she is able to return. Main concern for family is that [facility] cannot provide IV pain medication. If IV pain meds are needed they would prefer [facility] until she can tolerate other forms of pain medication. They understand patient will need to meet criteria for the hospice house in order to go there and time frame there is limited. For questions I can be reached at [phone].  Narcisa Moctezuma, [place]

## 2021-05-01 NOTE — PROGRESS NOTES
Trauma Attending Progress Note    CC: fall      S: agitated and in 2 point restraints, purposeful but not able to communicated,      GEN mild distress  HEENT: PERRL 3mm b/l orbital ecchymosis,    Resp non labored clear b/l   CVS RR ++ systolic murmur   ABD SNT obese   EXT NVI no obvious deformities, mild pedal edema   SPINE: tender C spine, t/l mild tenderness      A/P s/p fall with ICH, C1/2 fx, vert injury,      - ICH, C spine fx, NS to see, co collar, keppra, MRI didn't see a fx, custom collar   - vert injury asa,   - smi deep breathing pain control   - home meds,   - palliative care recs appreciated, delerium and agiation [facility]. ..  - d/c planning when able    Nam Ferguson MD

## 2021-05-01 NOTE — PROGRESS NOTES
Patient assessed and is not pulling at lines and is sleeping at this time. Bilateral wrist restraints discontinued at this time. Patient has tele-sitter in room. Will continue to round hourly to asses patient.

## 2021-05-01 NOTE — PROGRESS NOTES
Liaison Informational Visit Note      Referral received from [facility]               Patient Name: Earlene Miller   :  1927  MRN:  [id number]    Admit date:  2021      Hospital Admitting Physician:  Yesenia Ramos MD   PCP:  Hannah Montelongo DO (Inactive)    Primary Insurance: Payor: Sarah Cosme /  /  /      Emergency Contact:      Contact/Relation:   /         Phone:         Advance Directive  Advance directives received No  Patient has a documented healthcare surrogate  Discussed with: Family member  DPOA-HC Name-Relation:    Phone:       Terminal Diagnosis SDH, SAH as confirmed by Dr. Sina Issa, [address] Problem List:   Patient Active Problem List   Diagnosis Code    SDH (subdural hematoma) [facility]) J76.6N9K    Fall W19. Lequita Yazmin    Injury of right vertebral artery S15.101A       Code Status Order: DNR-CC    Allergies:  Amoxicillin and Morphine    Family Goal: comfort care    Meeting held with son and daughter Harvinder Marlow. Referral received. Chart reviewed. Met with family members at the bedside. Explained hospice philosophy and no longer pursuing any further aggressive treatment. Focusing on comfort care only. Explained hospice benefit and reviewed all levels of care including the hospice house for short term symptom management. Once symptoms are managed, patient would transfer to a routine level of care either home or ECF with hospice. Explained room and board would be private pay at a facility. Discussed roles and frequency visits of skilled nursing, personal care team and SW.  Daughter stated patient is from [id number] [place] [address]. Discussed patient may need to return to the long term care side. Hospital SW will be able to reach out to the facility and see if there is a bed available and cost. Harvinder Marlow expressed that patient has lived there for three years. Lived in the [facility]. Patient needed help with dressing and bathing. Harvinder Marlow stated [facility] has their own hospice company.  She asked what services they provided at the facility. Explained that I do not know that answer and she would have to reach out to them. Answered all questions relating to [facility] services. Brochure given. Emotional support and active listening provided. Updated Ailyn Bedside RN. Will continue to follow. Discharge Plan:  Discharge Disposition;  Unknown at this time      Electronically signed by Azam Charles RN on 5/1/2021 at 2:11 PM

## 2021-05-01 NOTE — PROGRESS NOTES
Spoke with Rabia Rodriguez about patient's care moving forward. Spoke about her condition and how she is not getting any better, we spoke about hospice care. I placed a consult to hospice care, he wants to have this discussion with his sister who is from the area. After they decide they will let us know which hospice agency they have chosen.

## 2021-05-02 LAB
ALBUMIN SERPL-MCNC: 3.3 G/DL (ref 3.5–5.2)
ALP BLD-CCNC: 69 U/L (ref 35–104)
ALT SERPL-CCNC: 19 U/L (ref 0–32)
ANION GAP SERPL CALCULATED.3IONS-SCNC: 9 MMOL/L (ref 7–16)
AST SERPL-CCNC: 21 U/L (ref 0–31)
BASOPHILS ABSOLUTE: 0.02 E9/L (ref 0–0.2)
BASOPHILS RELATIVE PERCENT: 0.3 % (ref 0–2)
BILIRUB SERPL-MCNC: 2.5 MG/DL (ref 0–1.2)
BUN BLDV-MCNC: 14 MG/DL (ref 6–23)
CALCIUM SERPL-MCNC: 9.2 MG/DL (ref 8.6–10.2)
CHLORIDE BLD-SCNC: 101 MMOL/L (ref 98–107)
CO2: 30 MMOL/L (ref 22–29)
CREAT SERPL-MCNC: 0.6 MG/DL (ref 0.5–1)
EOSINOPHILS ABSOLUTE: 0.01 E9/L (ref 0.05–0.5)
EOSINOPHILS RELATIVE PERCENT: 0.1 % (ref 0–6)
GFR AFRICAN AMERICAN: >60
GFR NON-AFRICAN AMERICAN: >60 ML/MIN/1.73
GLUCOSE BLD-MCNC: 125 MG/DL (ref 74–99)
HCT VFR BLD CALC: 39.8 % (ref 34–48)
HEMOGLOBIN: 12.6 G/DL (ref 11.5–15.5)
IMMATURE GRANULOCYTES #: 0.03 E9/L
IMMATURE GRANULOCYTES %: 0.4 % (ref 0–5)
LYMPHOCYTES ABSOLUTE: 0.73 E9/L (ref 1.5–4)
LYMPHOCYTES RELATIVE PERCENT: 10.6 % (ref 20–42)
MAGNESIUM: 2 MG/DL (ref 1.6–2.6)
MCH RBC QN AUTO: 30.9 PG (ref 26–35)
MCHC RBC AUTO-ENTMCNC: 31.7 % (ref 32–34.5)
MCV RBC AUTO: 97.5 FL (ref 80–99.9)
MONOCYTES ABSOLUTE: 0.52 E9/L (ref 0.1–0.95)
MONOCYTES RELATIVE PERCENT: 7.5 % (ref 2–12)
NEUTROPHILS ABSOLUTE: 5.59 E9/L (ref 1.8–7.3)
NEUTROPHILS RELATIVE PERCENT: 81.1 % (ref 43–80)
PDW BLD-RTO: 14.3 FL (ref 11.5–15)
PLATELET # BLD: 133 E9/L (ref 130–450)
PMV BLD AUTO: 10.6 FL (ref 7–12)
POTASSIUM REFLEX MAGNESIUM: 3.3 MMOL/L (ref 3.5–5)
RBC # BLD: 4.08 E12/L (ref 3.5–5.5)
SODIUM BLD-SCNC: 140 MMOL/L (ref 132–146)
TOTAL PROTEIN: 6 G/DL (ref 6.4–8.3)
WBC # BLD: 6.9 E9/L (ref 4.5–11.5)

## 2021-05-02 PROCEDURE — 36415 COLL VENOUS BLD VENIPUNCTURE: CPT

## 2021-05-02 PROCEDURE — 94640 AIRWAY INHALATION TREATMENT: CPT

## 2021-05-02 PROCEDURE — 6370000000 HC RX 637 (ALT 250 FOR IP): Performed by: STUDENT IN AN ORGANIZED HEALTH CARE EDUCATION/TRAINING PROGRAM

## 2021-05-02 PROCEDURE — 6360000002 HC RX W HCPCS: Performed by: STUDENT IN AN ORGANIZED HEALTH CARE EDUCATION/TRAINING PROGRAM

## 2021-05-02 PROCEDURE — 1200000000 HC SEMI PRIVATE

## 2021-05-02 PROCEDURE — 80053 COMPREHEN METABOLIC PANEL: CPT

## 2021-05-02 PROCEDURE — 2580000003 HC RX 258: Performed by: STUDENT IN AN ORGANIZED HEALTH CARE EDUCATION/TRAINING PROGRAM

## 2021-05-02 PROCEDURE — 99232 SBSQ HOSP IP/OBS MODERATE 35: CPT | Performed by: SURGERY

## 2021-05-02 PROCEDURE — 2500000003 HC RX 250 WO HCPCS: Performed by: STUDENT IN AN ORGANIZED HEALTH CARE EDUCATION/TRAINING PROGRAM

## 2021-05-02 PROCEDURE — 2700000000 HC OXYGEN THERAPY PER DAY

## 2021-05-02 PROCEDURE — 85025 COMPLETE CBC W/AUTO DIFF WBC: CPT

## 2021-05-02 PROCEDURE — 83735 ASSAY OF MAGNESIUM: CPT

## 2021-05-02 RX ADMIN — HYDROMORPHONE HYDROCHLORIDE 0.5 MG: 1 INJECTION, SOLUTION INTRAMUSCULAR; INTRAVENOUS; SUBCUTANEOUS at 10:30

## 2021-05-02 RX ADMIN — IPRATROPIUM BROMIDE AND ALBUTEROL SULFATE 1 AMPULE: .5; 3 SOLUTION RESPIRATORY (INHALATION) at 17:02

## 2021-05-02 RX ADMIN — LEVETIRACETAM 500 MG: 5 INJECTION INTRAVENOUS at 13:03

## 2021-05-02 RX ADMIN — LEVETIRACETAM 500 MG: 5 INJECTION INTRAVENOUS at 01:02

## 2021-05-02 RX ADMIN — FAMOTIDINE 10 MG: 10 INJECTION INTRAVENOUS at 20:44

## 2021-05-02 RX ADMIN — IPRATROPIUM BROMIDE AND ALBUTEROL SULFATE 1 AMPULE: .5; 3 SOLUTION RESPIRATORY (INHALATION) at 12:53

## 2021-05-02 RX ADMIN — Medication 10 ML: at 20:45

## 2021-05-02 RX ADMIN — FAMOTIDINE 10 MG: 10 INJECTION INTRAVENOUS at 09:31

## 2021-05-02 RX ADMIN — IPRATROPIUM BROMIDE AND ALBUTEROL SULFATE 1 AMPULE: .5; 3 SOLUTION RESPIRATORY (INHALATION) at 21:38

## 2021-05-02 RX ADMIN — HYDROMORPHONE HYDROCHLORIDE 0.5 MG: 1 INJECTION, SOLUTION INTRAMUSCULAR; INTRAVENOUS; SUBCUTANEOUS at 18:02

## 2021-05-02 ASSESSMENT — PAIN SCALES - GENERAL: PAINLEVEL_OUTOF10: 0

## 2021-05-02 ASSESSMENT — PAIN DESCRIPTION - DESCRIPTORS: DESCRIPTORS: PATIENT UNABLE TO DESCRIBE

## 2021-05-02 ASSESSMENT — PAIN SCALES - WONG BAKER
WONGBAKER_NUMERICALRESPONSE: 6
WONGBAKER_NUMERICALRESPONSE: 0

## 2021-05-02 NOTE — PROGRESS NOTES
IMPRESSION:  Subdural and subarachnoid hemorrhage, left frontal lobe. She has fracture of the arch of C1 and fractured odontoid process of C2. Multiple facial fracture. Not awake. Does not respond to commands. Pupils unremarkable  Reviewed MRI scan of cervical spine. 1.  No fracture or joint dislocation is seen. 2. Degenerative changes.  Moderate central canal stenosis at C4-5.  Mild   stenoses at C3-4, C5-6, C6-7 and C7-T1. 3.  Multilevel neural foraminal stenoses, worst (severe) at C3-4, C4-5 and   C7-T1. 4.  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 Has cervical collar on. Will repeat CT scan of brain tomorrow AM.  Spoke with family   Nothing new to add from neurosurgical stand point at this time.

## 2021-05-02 NOTE — PROGRESS NOTES
Visit made to unit. No family present. Yadira Nobles RN at the bedside. Patient appears to be resting comfortably. No comfort medications given since yesterday at 1603. Assisted with repositioning patient in bed. No signs of pain, restlessness or labored breathing noted. Skin warm to touch. No mottling noted. Rashid draining a small amount of chelle urine. C collar in place. Patient is appropriate for routine level of hospice care either at home or ECF with hospice. Patient is from [address] [place] [address]. Will continue to follow and assist SW/CM with discharge planning.

## 2021-05-02 NOTE — PROGRESS NOTES
Trauma Attending Progress Note    CC: fall      S: resting less agitated  BP (!) 141/66   Pulse 97   Temp 97 °F (36.1 °C) (Temporal)   Resp 18   Ht 5' (1.524 m)   Wt 135 lb (61.2 kg)   SpO2 96%   BMI 26.37 kg/m²     GEN mild distress  HEENT: PERRL 3mm b/l orbital ecchymosis,    Resp non labored clear b/l   CVS RR ++ systolic murmur   ABD SNT obese   EXT NVI no obvious deformities, mild pedal edema   SPINE: tender C spine, t/l mild tenderness      A/P s/p fall with ICH, C1/2 fx, vert injury,      - ICH, C spine fx, NS to see, co collar, keppra, MRI didn't see a fx, custom collar   - vert injury asa,   - smi deep breathing pain control   - home meds,   - palliative care recs appreciated, delerium and agiation [facility]. ..family would like hospice.   - d/c planning when able    Latoya Zapata MD

## 2021-05-03 ENCOUNTER — APPOINTMENT (OUTPATIENT)
Dept: CT IMAGING | Age: 86
DRG: 083 | End: 2021-05-03
Payer: MEDICARE

## 2021-05-03 VITALS
RESPIRATION RATE: 18 BRPM | TEMPERATURE: 97.9 F | DIASTOLIC BLOOD PRESSURE: 73 MMHG | HEART RATE: 105 BPM | BODY MASS INDEX: 26.5 KG/M2 | HEIGHT: 60 IN | SYSTOLIC BLOOD PRESSURE: 153 MMHG | OXYGEN SATURATION: 98 % | WEIGHT: 135 LBS

## 2021-05-03 LAB
ALBUMIN SERPL-MCNC: 2.8 G/DL (ref 3.5–5.2)
ALP BLD-CCNC: 62 U/L (ref 35–104)
ALT SERPL-CCNC: 15 U/L (ref 0–32)
ANION GAP SERPL CALCULATED.3IONS-SCNC: 9 MMOL/L (ref 7–16)
AST SERPL-CCNC: 15 U/L (ref 0–31)
BASOPHILS ABSOLUTE: 0.01 E9/L (ref 0–0.2)
BASOPHILS RELATIVE PERCENT: 0.2 % (ref 0–2)
BILIRUB SERPL-MCNC: 2.8 MG/DL (ref 0–1.2)
BUN BLDV-MCNC: 19 MG/DL (ref 6–23)
CALCIUM SERPL-MCNC: 9.1 MG/DL (ref 8.6–10.2)
CHLORIDE BLD-SCNC: 101 MMOL/L (ref 98–107)
CO2: 26 MMOL/L (ref 22–29)
CREAT SERPL-MCNC: 0.6 MG/DL (ref 0.5–1)
EOSINOPHILS ABSOLUTE: 0.01 E9/L (ref 0.05–0.5)
EOSINOPHILS RELATIVE PERCENT: 0.2 % (ref 0–6)
GFR AFRICAN AMERICAN: >60
GFR NON-AFRICAN AMERICAN: >60 ML/MIN/1.73
GLUCOSE BLD-MCNC: 122 MG/DL (ref 74–99)
HCT VFR BLD CALC: 41.8 % (ref 34–48)
HEMOGLOBIN: 12.9 G/DL (ref 11.5–15.5)
IMMATURE GRANULOCYTES #: 0.03 E9/L
IMMATURE GRANULOCYTES %: 0.5 % (ref 0–5)
LYMPHOCYTES ABSOLUTE: 1.11 E9/L (ref 1.5–4)
LYMPHOCYTES RELATIVE PERCENT: 18.4 % (ref 20–42)
MAGNESIUM: 2 MG/DL (ref 1.6–2.6)
MCH RBC QN AUTO: 31.2 PG (ref 26–35)
MCHC RBC AUTO-ENTMCNC: 30.9 % (ref 32–34.5)
MCV RBC AUTO: 101 FL (ref 80–99.9)
MONOCYTES ABSOLUTE: 0.49 E9/L (ref 0.1–0.95)
MONOCYTES RELATIVE PERCENT: 8.1 % (ref 2–12)
NEUTROPHILS ABSOLUTE: 4.38 E9/L (ref 1.8–7.3)
NEUTROPHILS RELATIVE PERCENT: 72.6 % (ref 43–80)
PDW BLD-RTO: 14.3 FL (ref 11.5–15)
PLATELET # BLD: 141 E9/L (ref 130–450)
PMV BLD AUTO: 10.6 FL (ref 7–12)
POTASSIUM REFLEX MAGNESIUM: 3.4 MMOL/L (ref 3.5–5)
RBC # BLD: 4.14 E12/L (ref 3.5–5.5)
SARS-COV-2, NAAT: NOT DETECTED
SODIUM BLD-SCNC: 136 MMOL/L (ref 132–146)
TOTAL PROTEIN: 5.9 G/DL (ref 6.4–8.3)
WBC # BLD: 6 E9/L (ref 4.5–11.5)

## 2021-05-03 PROCEDURE — L0172 CERV COL SR FOAM 2PC PRE OTS: HCPCS

## 2021-05-03 PROCEDURE — 6370000000 HC RX 637 (ALT 250 FOR IP): Performed by: STUDENT IN AN ORGANIZED HEALTH CARE EDUCATION/TRAINING PROGRAM

## 2021-05-03 PROCEDURE — 2500000003 HC RX 250 WO HCPCS: Performed by: STUDENT IN AN ORGANIZED HEALTH CARE EDUCATION/TRAINING PROGRAM

## 2021-05-03 PROCEDURE — 85025 COMPLETE CBC W/AUTO DIFF WBC: CPT

## 2021-05-03 PROCEDURE — 87635 SARS-COV-2 COVID-19 AMP PRB: CPT

## 2021-05-03 PROCEDURE — 6360000002 HC RX W HCPCS: Performed by: STUDENT IN AN ORGANIZED HEALTH CARE EDUCATION/TRAINING PROGRAM

## 2021-05-03 PROCEDURE — 94640 AIRWAY INHALATION TREATMENT: CPT

## 2021-05-03 PROCEDURE — 36415 COLL VENOUS BLD VENIPUNCTURE: CPT

## 2021-05-03 PROCEDURE — 80053 COMPREHEN METABOLIC PANEL: CPT

## 2021-05-03 PROCEDURE — 70450 CT HEAD/BRAIN W/O DYE: CPT

## 2021-05-03 PROCEDURE — 83735 ASSAY OF MAGNESIUM: CPT

## 2021-05-03 PROCEDURE — 6360000002 HC RX W HCPCS: Performed by: CLINICAL NURSE SPECIALIST

## 2021-05-03 PROCEDURE — 2580000003 HC RX 258: Performed by: STUDENT IN AN ORGANIZED HEALTH CARE EDUCATION/TRAINING PROGRAM

## 2021-05-03 PROCEDURE — 2700000000 HC OXYGEN THERAPY PER DAY

## 2021-05-03 PROCEDURE — 99238 HOSP IP/OBS DSCHRG MGMT 30/<: CPT | Performed by: SURGERY

## 2021-05-03 RX ADMIN — LEVETIRACETAM 500 MG: 5 INJECTION INTRAVENOUS at 03:17

## 2021-05-03 RX ADMIN — HYDROMORPHONE HYDROCHLORIDE 1 MG: 1 INJECTION, SOLUTION INTRAMUSCULAR; INTRAVENOUS; SUBCUTANEOUS at 16:42

## 2021-05-03 RX ADMIN — SODIUM CHLORIDE 25 ML: 9 INJECTION, SOLUTION INTRAVENOUS at 12:38

## 2021-05-03 RX ADMIN — IPRATROPIUM BROMIDE AND ALBUTEROL SULFATE 1 AMPULE: .5; 3 SOLUTION RESPIRATORY (INHALATION) at 13:26

## 2021-05-03 RX ADMIN — HYDROMORPHONE HYDROCHLORIDE 0.5 MG: 1 INJECTION, SOLUTION INTRAMUSCULAR; INTRAVENOUS; SUBCUTANEOUS at 12:28

## 2021-05-03 RX ADMIN — IPRATROPIUM BROMIDE AND ALBUTEROL SULFATE 1 AMPULE: .5; 3 SOLUTION RESPIRATORY (INHALATION) at 09:51

## 2021-05-03 RX ADMIN — SODIUM CHLORIDE, PRESERVATIVE FREE 10 ML: 5 INJECTION INTRAVENOUS at 16:41

## 2021-05-03 RX ADMIN — FAMOTIDINE 10 MG: 10 INJECTION INTRAVENOUS at 10:36

## 2021-05-03 RX ADMIN — Medication 10 ML: at 10:36

## 2021-05-03 RX ADMIN — LEVETIRACETAM 500 MG: 5 INJECTION INTRAVENOUS at 12:38

## 2021-05-03 ASSESSMENT — PAIN SCALES - PAIN ASSESSMENT IN ADVANCED DEMENTIA (PAINAD)
BODYLANGUAGE: 0
CONSOLABILITY: 0
FACIALEXPRESSION: 0
TOTALSCORE: 0

## 2021-05-03 NOTE — PROGRESS NOTES
Palliative medicine    Chart reviewed. Discharge plan to go to facility with hospice services. Code status DNR-CC  No additional palliative medicine needs. Please reconsult if additional needs arise.   Thank you   Lelo Zapata PA-C

## 2021-05-03 NOTE — PROGRESS NOTES
Physical Therapy    PT order received, chart reviewed and PT evaluation attempted this date. Per pt's daughters, pt has been nonverbal and not following commands. Pt had participated in therapy in the past year and was discharged from therapy services due to not being able to follow commands for therapy. Family has elected to pursue hospice services and wish for comfort care only. They declined therapy services. PT order will be discontinued at this time. Thank you for the opportunity to assist in the care of this patient.   Malina Rose, PT, DPT  License PT [id number]

## 2021-05-03 NOTE — PROGRESS NOTES
PCP is DEEPAK Mcgill DO (Inactive)  Office notified of admission.       Electronically signed by Sarina Loza RN MSN APRN-NP [facility] NP  CCNS CCRN 5/3/2021 12:24 PM

## 2021-05-03 NOTE — PROGRESS NOTES
OT consult received and appreciated. Chart reviewed. Will discontinue OT orders due to patient to be discharged into hospice care. Thank you.  Ravin Mueller, OTR/L #[id number]\

## 2021-05-03 NOTE — CARE COORDINATION
5/3/2021 social work transition of care planning  Sw notified that pt could return to [address] [place] [address] with [facility], when ready. Sw left message for Colby Carney [phone] to confirm transition of care planning. Electronically signed by YANNI Calvillo on 5/3/2021 at 11:32 AM     5/3/2021 social work transition of care planning  Sw spoke with family at bedside. Pt plan is to return to [address] [place] [address] with [facility] today. Oh set up PAS for 4 pm transport back to [place]. Sw notified [facility], Nurse and pt's family at bedside. Pt maryann need neg covid result-test given to nursing.   Electronically signed by YANNI Calvillo on 5/3/2021 at 2:03 PM

## 2021-05-03 NOTE — PROGRESS NOTES
IMPRESSION:  Subdural and subarachnoid hemorrhage, left frontal lobe. She has fracture of the arch of C1 and fractured odontoid process of C2. Multiple facial fracture. Multiple medical comorbidities  No indication for neurosurgical intervention at this time. Not awake & responsive. At times responds to family. Reviewed CT scan from today:  Slight worsening of subarachnoid hemorrhage in the posterior left temporal   region.         Other areas of previously seen intracranial hemorrhage demonstrate no   appreciable change. Neck collar in place.